# Patient Record
Sex: FEMALE | Race: OTHER | HISPANIC OR LATINO | Employment: STUDENT | URBAN - METROPOLITAN AREA
[De-identification: names, ages, dates, MRNs, and addresses within clinical notes are randomized per-mention and may not be internally consistent; named-entity substitution may affect disease eponyms.]

---
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
## 2022-05-09 ENCOUNTER — OFFICE VISIT (OUTPATIENT)
Dept: OBGYN CLINIC | Facility: CLINIC | Age: 22
End: 2022-05-09
Payer: MEDICARE

## 2022-05-09 VITALS
BODY MASS INDEX: 22.84 KG/M2 | DIASTOLIC BLOOD PRESSURE: 60 MMHG | HEIGHT: 61 IN | WEIGHT: 121 LBS | SYSTOLIC BLOOD PRESSURE: 110 MMHG

## 2022-05-09 DIAGNOSIS — Z30.09 ENCOUNTER FOR COUNSELING REGARDING CONTRACEPTION: Primary | ICD-10-CM

## 2022-05-09 PROCEDURE — 99202 OFFICE O/P NEW SF 15 MIN: CPT | Performed by: NURSE PRACTITIONER

## 2022-05-09 RX ORDER — ETONOGESTREL 68 MG/1
68 IMPLANT SUBCUTANEOUS ONCE
COMMUNITY

## 2022-05-09 NOTE — PROGRESS NOTES
Uriel Caldwell is a 25 y o  female who presents for consult for Nexplanon removal  She has had her Nexplanon for 1 year and is not happy with her bleeding pattern  The patient is sexually active  Pertinent past medical history: none  Menstrual History:  OB History        0    Para   0    Term   0       0    AB   0    Living   0       SAB   0    IAB   0    Ectopic   0    Multiple   0    Live Births   0                  Patient's last menstrual period was 2022  The following portions of the patient's history were reviewed and updated as appropriate: allergies, current medications, past family history, past medical history, past social history, past surgical history and problem list     Review of Systems  Pertinent items are noted in HPI  Objective      /60   Ht 5' 1" (1 549 m)   Wt 54 9 kg (121 lb)   LMP 2022   BMI 22 86 kg/m²     Physical Exam  Constitutional:       Appearance: Normal appearance  HENT:      Head: Normocephalic and atraumatic  Musculoskeletal:        Arms:       Cervical back: Neck supple  Neurological:      Mental Status: She is alert  Skin:     General: Skin is warm  Vitals and nursing note reviewed  Assessment and Plan     25 y o , discontinuing nexplanon, no contraindications  She will be transitioning to an OCP            Schedule appt for Nexplanon removal

## 2022-10-04 ENCOUNTER — HOSPITAL ENCOUNTER (EMERGENCY)
Facility: HOSPITAL | Age: 22
Discharge: HOME | End: 2022-10-04
Attending: EMERGENCY MEDICINE
Payer: MEDICARE

## 2022-10-04 VITALS
WEIGHT: 119.71 LBS | SYSTOLIC BLOOD PRESSURE: 166 MMHG | DIASTOLIC BLOOD PRESSURE: 80 MMHG | OXYGEN SATURATION: 100 % | TEMPERATURE: 98.8 F | HEART RATE: 88 BPM | RESPIRATION RATE: 18 BRPM | BODY MASS INDEX: 22.62 KG/M2

## 2022-10-04 DIAGNOSIS — R30.0 DYSURIA: Primary | ICD-10-CM

## 2022-10-04 DIAGNOSIS — R10.2 PELVIC PAIN: ICD-10-CM

## 2022-10-04 LAB
BILIRUB UR QL STRIP: NEGATIVE
CLARITY UR: CLEAR
COLOR UR: YELLOW
EXT PREG TEST URINE: NEGATIVE
EXT. CONTROL ED NAV: NORMAL
GLUCOSE UR STRIP-MCNC: NEGATIVE MG/DL
HGB UR QL STRIP.AUTO: NEGATIVE
KETONES UR STRIP-MCNC: NEGATIVE MG/DL
LEUKOCYTE ESTERASE UR QL STRIP: NEGATIVE
NITRITE UR QL STRIP: NEGATIVE
PH UR STRIP.AUTO: 8 [PH]
PROT UR STRIP-MCNC: NEGATIVE MG/DL
SP GR UR STRIP.AUTO: 1.01 (ref 1–1.04)
UROBILINOGEN UA: 1 MG/DL

## 2022-10-04 PROCEDURE — 99283 EMERGENCY DEPT VISIT LOW MDM: CPT

## 2022-10-04 PROCEDURE — 87591 N.GONORRHOEAE DNA AMP PROB: CPT

## 2022-10-04 PROCEDURE — 96372 THER/PROPH/DIAG INJ SC/IM: CPT

## 2022-10-04 PROCEDURE — 87660 TRICHOMONAS VAGIN DIR PROBE: CPT

## 2022-10-04 PROCEDURE — 87480 CANDIDA DNA DIR PROBE: CPT

## 2022-10-04 PROCEDURE — 81003 URINALYSIS AUTO W/O SCOPE: CPT

## 2022-10-04 PROCEDURE — 87510 GARDNER VAG DNA DIR PROBE: CPT

## 2022-10-04 PROCEDURE — 99284 EMERGENCY DEPT VISIT MOD MDM: CPT

## 2022-10-04 PROCEDURE — 81025 URINE PREGNANCY TEST: CPT

## 2022-10-04 PROCEDURE — 87491 CHLMYD TRACH DNA AMP PROBE: CPT

## 2022-10-04 RX ORDER — DOXYCYCLINE HYCLATE 100 MG/1
100 CAPSULE ORAL 2 TIMES DAILY
Qty: 14 CAPSULE | Refills: 0 | Status: SHIPPED | OUTPATIENT
Start: 2022-10-04 | End: 2022-10-11

## 2022-10-04 RX ORDER — NAPROXEN 500 MG/1
500 TABLET ORAL 2 TIMES DAILY WITH MEALS
Qty: 10 TABLET | Refills: 0 | Status: SHIPPED | OUTPATIENT
Start: 2022-10-04 | End: 2023-10-04

## 2022-10-04 RX ADMIN — LIDOCAINE HYDROCHLORIDE 500 MG: 10 INJECTION, SOLUTION EPIDURAL; INFILTRATION; INTRACAUDAL; PERINEURAL at 22:06

## 2022-10-04 NOTE — Clinical Note
Lauren Blancas was seen and treated in our emergency department on 10/4/2022  Diagnosis:     Pantera Sanchez  may return to work on return date  She may return on this date: 10/05/2022         If you have any questions or concerns, please don't hesitate to call        Joie Mccullough PA-C    ______________________________           _______________          _______________  Hospital Representative                              Date                                Time

## 2022-10-04 NOTE — Clinical Note
Merari Enter was seen and treated in our emergency department on 10/4/2022  Diagnosis:     Ron Villegas  may return to work on return date  She may return on this date: 10/05/2022         If you have any questions or concerns, please don't hesitate to call        Daja May PA-C    ______________________________           _______________          _______________  Hospital Representative                              Date                                Time

## 2022-10-04 NOTE — Clinical Note
Colleen Rainey was seen and treated in our emergency department on 10/4/2022  Diagnosis:     Marybel Lomeli  may return to work on return date  She may return on this date: 10/05/2022         If you have any questions or concerns, please don't hesitate to call        Beatris Blum PA-C    ______________________________           _______________          _______________  Hospital Representative                              Date                                Time

## 2022-10-04 NOTE — Clinical Note
Candida Barakat was seen and treated in our emergency department on 10/4/2022  Diagnosis:     Sarah Nguyen  may return to work on return date  She may return on this date: 10/05/2022         If you have any questions or concerns, please don't hesitate to call        Charmayne Honey, PA-C    ______________________________           _______________          _______________  Hospital Representative                              Date                                Time

## 2022-10-04 NOTE — Clinical Note
Gabriel Salamanca was seen and treated in our emergency department on 10/4/2022  Diagnosis:     Vanita Garza  may return to work on return date  She may return on this date: 10/05/2022         If you have any questions or concerns, please don't hesitate to call        Kathya Chin PA-C    ______________________________           _______________          _______________  Hospital Representative                              Date                                Time

## 2022-10-05 LAB
C TRACH DNA SPEC QL NAA+PROBE: NEGATIVE
N GONORRHOEA DNA SPEC QL NAA+PROBE: NEGATIVE

## 2022-10-05 NOTE — DISCHARGE INSTRUCTIONS
If you would like further testing for sexually transmitted diseases please follow-up with the [facility] at [address]  Phone number is [phone]    You will be called in approximately 3 business days ONLY if you are positive for the sexually transmitted diseases for which you were tested today      Follow up with OBGYN    Return to ED for new or worsening symptoms as discussed

## 2022-10-05 NOTE — ED PROVIDER NOTES
History  Chief Complaint   Patient presents with    Bladder Problem     Pt reports via  that she is having pain in her bladder and groin that has been off and on for two months, but has worsened yesterday  Pt reports that she does have pain while urinating sometimes  No meds taken  Reports unprotected sex about 2 months ago      25 y o  F presents to ED c/o pelvic pain, vaginal discharge, dysuria x 1 month  History provided by:  Patient   used: Yes    Vaginal Discharge  Quality:  Malodorous and yellow  Duration:  1 month  Timing:  Sporadic  Progression:  Waxing and waning  Chronicity:  New  Context: after intercourse    Relieved by:  Nothing  Worsened by:  Nothing  Associated symptoms: abdominal pain (pelvic/suprapubic ), dyspareunia and dysuria    Associated symptoms: no fever, no genital lesions, no nausea, no rash, no urinary frequency, no urinary hesitancy, no urinary incontinence, no vaginal itching and no vomiting    Abdominal pain:     Location:  Suprapubic (pelvic)    Quality: cramping      Severity:  Mild    Duration:  1 month    Timing:  Sporadic    Progression:  Waxing and waning    Chronicity:  New  Dysuria:     Duration:  1 month    Timing:  Intermittent  Risk factors: unprotected sex    Risk factors: no gynecological surgery        Prior to Admission Medications   Prescriptions Last Dose Informant Patient Reported? Taking?   etonogestrel (Nexplanon) subdermal implant   Yes No   Si mg by Subdermal route once      Facility-Administered Medications: None       History reviewed  No pertinent past medical history  History reviewed  No pertinent surgical history  Family History   Problem Relation Age of Onset    Cancer Maternal Grandmother      I have reviewed and agree with the history as documented      E-Cigarette/Vaping    E-Cigarette Use Former User      E-Cigarette/Vaping Substances    Nicotine No     THC No     CBD No     Flavoring No     Other No  Unknown No      Social History     Tobacco Use    Smoking status: Never Smoker    Smokeless tobacco: Never Used   Vaping Use    Vaping Use: Former   Substance Use Topics    Alcohol use: No    Drug use: Yes     Types: Marijuana       Review of Systems   Constitutional: Negative for chills and fever  Eyes: Negative for discharge and redness  Respiratory: Negative for shortness of breath  Cardiovascular: Negative for chest pain  Gastrointestinal: Positive for abdominal pain (pelvic/suprapubic )  Negative for abdominal distention, blood in stool, nausea and vomiting  Genitourinary: Positive for dyspareunia, dysuria, pelvic pain, vaginal discharge and vaginal pain  Negative for bladder incontinence, decreased urine volume, difficulty urinating, flank pain, frequency, genital sores, hematuria, hesitancy, menstrual problem, urgency and vaginal bleeding  Musculoskeletal: Negative for arthralgias and myalgias  Skin: Negative for color change and rash  Neurological: Negative for weakness and numbness  All other systems reviewed and are negative  Physical Exam  Physical Exam  Vitals and nursing note reviewed  Exam conducted with a chaperone present (ED tech Thang )  Constitutional:       General: She is awake  She is not in acute distress  Appearance: Normal appearance  She is not toxic-appearing or diaphoretic  HENT:      Head: Normocephalic and atraumatic  Jaw: No swelling  Right Ear: External ear normal       Left Ear: External ear normal       Mouth/Throat:      Lips: Pink  Mouth: Mucous membranes are moist    Eyes:      General: Lids are normal  Vision grossly intact  Right eye: No discharge  Left eye: No discharge  Conjunctiva/sclera: Conjunctivae normal    Cardiovascular:      Rate and Rhythm: Normal rate and regular rhythm  Heart sounds: Normal heart sounds  Pulmonary:      Effort: Pulmonary effort is normal  No respiratory distress  Breath sounds: Normal breath sounds  Abdominal:      General: There is no distension  Palpations: Abdomen is soft  Tenderness: There is no abdominal tenderness  There is no right CVA tenderness, left CVA tenderness or guarding  Genitourinary:     General: Normal vulva  Labia:         Right: No rash, tenderness, lesion or injury  Left: No rash, tenderness, lesion or injury  Vagina: Vaginal discharge present  No tenderness or bleeding  Cervix: No cervical motion tenderness or cervical bleeding  Musculoskeletal:      Cervical back: Neck supple  Skin:     General: Skin is warm and dry  Coloration: Skin is not jaundiced or pale  Neurological:      Mental Status: She is alert  Gait: Gait normal    Psychiatric:         Mood and Affect: Mood normal          Behavior: Behavior normal          Thought Content: Thought content normal          Vital Signs  ED Triage Vitals [10/04/22 2042]   Temperature Pulse Respirations Blood Pressure SpO2   98 8 °F (37 1 °C) 88 18 166/80 100 %      Temp Source Heart Rate Source Patient Position - Orthostatic VS BP Location FiO2 (%)   Oral Monitor Sitting Left arm --      Pain Score       --           Vitals:    10/04/22 2042   BP: 166/80   Pulse: 88   Patient Position - Orthostatic VS: Sitting         Visual Acuity      ED Medications  Medications   cefTRIAXone (ROCEPHIN) 500 mg in lidocaine (PF) (XYLOCAINE-MPF) 1 % IM only syringe (500 mg Intramuscular Given 10/4/22 2206)       Diagnostic Studies  Results Reviewed     Procedure Component Value Units Date/Time    VAGINOSIS DNA PROBE (AFFIRM) [[id number]] Collected: 10/04/22 2200    Lab Status:  In process Specimen: Genital from Vaginal Updated: 10/04/22 2211    UA w Reflex to Microscopic w Reflex to Culture [[id number]]  (Normal) Collected: 10/04/22 2106    Lab Status: Final result Specimen: Urine, Clean Catch Updated: 10/04/22 2125     Color, UA Yellow     Clarity, UA Clear     Specific [place], UA 1 015     pH, UA 8 0     Leukocytes, UA Negative     Nitrite, UA Negative     Protein, UA Negative mg/dl      Glucose, UA Negative mg/dl      Ketones, UA Negative mg/dl      Bilirubin, UA Negative     Occult Blood, UA Negative     UROBILINOGEN UA 1 0 mg/dL     POCT pregnancy, urine [[id number]]  (Normal) Resulted: 10/04/22 2111    Lab Status: Final result Updated: 10/04/22 2112     EXT PREG TEST UR (Ref: Negative) negative     Control valid    Chlamydia/GC amplified DNA by PCR [[id number]] Collected: 10/04/22 2106    Lab Status: In process Specimen: Urine, Other Updated: 10/04/22 2110                 No orders to display              Procedures  Procedures         ED Course  ED Course as of 10/04/22 2257   Tue Oct 04, 2022   2213 No chandelier signs  Benign abdominal exam                                              MDM  Number of Diagnoses or Management Options  Dysuria  Pelvic pain  Diagnosis management comments: VSS  Afebrile  No abdominal tenderness on exam  No CMT  Vaginal discharge noted  No lesions or pelvic swelling noted  Low clinical suspicion for PID  Low clinical suspicion for emergent intra-abdominal/intrapelvic pathology at this time, however strict return precautions discussed  Will test for Gonorrhea, chlamydia, trich, BV  UA without acute findings  Via shared decision making patient would like empiric STD treatment  OBGYN follow up given  All imaging and/or lab testing discussed with patient, strict return to ED precautions discussed  Patient recommended to follow up promptly with appropriate outpatient provider  Patient and/or family members verbalizes understanding and agrees with plan  Patient and/or family members were given opportunity to ask questions, all questions were answered at this time  Patient is stable for discharge      Portions of the record may have been created with voice recognition software   Occasional wrong word or "sound a like" substitutions may have occurred due to the inherent limitations of voice recognition software  Read the chart carefully and recognize, using context, where substitutions have occurred  Amount and/or Complexity of Data Reviewed  Clinical lab tests: ordered and reviewed        Disposition  Final diagnoses:   Dysuria   Pelvic pain     Time reflects when diagnosis was documented in both MDM as applicable and the Disposition within this note     Time User Action Codes Description Comment    10/4/2022 10:10 PM Talib De La Cruz Add [R30 0] Dysuria     10/4/2022 10:10 PM Talib De La Cruz Add [R10 2] Pelvic pain       ED Disposition     ED Disposition   Discharge    Condition   Stable    Date/Time   Tue Oct 4, 2022 10:12 PM    Comment   Gabriel Salamanca discharge to home/self care  Follow-up Information     Follow up With Specialties Details Why Contact Info Additional [facility] Schedule an appointment as soon as possible for a visit  For follow up regarding your symptoms [address]  [address] [facility] [address]  [address], [address] [facility], [facility] [place], [place], [address] [phone]          Discharge Medication List as of 10/4/2022 10:34 PM      START taking these medications    Details   doxycycline hyclate (VIBRAMYCIN) 100 mg capsule Take 1 capsule (100 mg total) by mouth 2 (two) times a day for 7 days, Starting Tue 10/4/2022, Until Tue 10/11/2022, Normal      naproxen (EC NAPROSYN) 500 MG EC tablet Take 1 tablet (500 mg total) by mouth 2 (two) times a day with meals, Starting Tue 10/4/2022, Until Wed 10/4/2023, Normal         CONTINUE these medications which have NOT CHANGED    Details   etonogestrel (Nexplanon) subdermal implant 68 mg by Subdermal route once, Historical Med             No discharge procedures on file      PDMP Review     None          ED Provider  Electronically Signed by           Danita Jean Baptiste PA-C  10/06/22 1801

## 2022-10-06 LAB
CANDIDA RRNA VAG QL PROBE: NEGATIVE
G VAGINALIS RRNA GENITAL QL PROBE: POSITIVE
T VAGINALIS RRNA GENITAL QL PROBE: NEGATIVE

## 2022-10-08 DIAGNOSIS — B96.89 BV (BACTERIAL VAGINOSIS): Primary | ICD-10-CM

## 2022-10-08 DIAGNOSIS — N76.0 BV (BACTERIAL VAGINOSIS): Primary | ICD-10-CM

## 2022-10-08 RX ORDER — METRONIDAZOLE 500 MG/1
500 TABLET ORAL EVERY 12 HOURS SCHEDULED
Qty: 14 TABLET | Refills: 0 | Status: SHIPPED | OUTPATIENT
Start: 2022-10-08 | End: 2022-10-15

## 2022-10-08 NOTE — RESULT ENCOUNTER NOTE
Attempted to call regarding +BV results  No answer on pt's or emergency contact number, unable to [facility]   Sent Rx for flagyl to pharmacy

## 2022-12-27 ENCOUNTER — HOSPITAL ENCOUNTER (EMERGENCY)
Facility: HOSPITAL | Age: 22
Discharge: HOME | End: 2022-12-27
Attending: EMERGENCY MEDICINE

## 2022-12-27 VITALS
TEMPERATURE: 98.5 F | BODY MASS INDEX: 20.8 KG/M2 | SYSTOLIC BLOOD PRESSURE: 132 MMHG | RESPIRATION RATE: 18 BRPM | WEIGHT: 110.1 LBS | OXYGEN SATURATION: 99 % | HEART RATE: 86 BPM | DIASTOLIC BLOOD PRESSURE: 86 MMHG

## 2022-12-27 DIAGNOSIS — G43.909 MIGRAINE WITHOUT STATUS MIGRAINOSUS, NOT INTRACTABLE, UNSPECIFIED MIGRAINE TYPE: Primary | ICD-10-CM

## 2022-12-27 LAB
EXT PREGNANCY TEST URINE: NEGATIVE
EXT. CONTROL: NORMAL

## 2022-12-27 RX ORDER — METOCLOPRAMIDE HYDROCHLORIDE 5 MG/ML
10 INJECTION INTRAMUSCULAR; INTRAVENOUS ONCE
Status: COMPLETED | OUTPATIENT
Start: 2022-12-27 | End: 2022-12-27

## 2022-12-27 RX ORDER — DIPHENHYDRAMINE HYDROCHLORIDE 50 MG/ML
25 INJECTION INTRAMUSCULAR; INTRAVENOUS ONCE
Status: COMPLETED | OUTPATIENT
Start: 2022-12-27 | End: 2022-12-27

## 2022-12-27 RX ORDER — KETOROLAC TROMETHAMINE 30 MG/ML
30 INJECTION, SOLUTION INTRAMUSCULAR; INTRAVENOUS ONCE
Status: COMPLETED | OUTPATIENT
Start: 2022-12-27 | End: 2022-12-27

## 2022-12-27 RX ADMIN — SODIUM CHLORIDE 1000 ML: 0.9 INJECTION, SOLUTION INTRAVENOUS at 20:24

## 2022-12-27 RX ADMIN — KETOROLAC TROMETHAMINE 30 MG: 30 INJECTION, SOLUTION INTRAMUSCULAR; INTRAVENOUS at 20:24

## 2022-12-27 RX ADMIN — DIPHENHYDRAMINE HYDROCHLORIDE 25 MG: 50 INJECTION, SOLUTION INTRAMUSCULAR; INTRAVENOUS at 20:26

## 2022-12-27 RX ADMIN — METOCLOPRAMIDE 10 MG: 5 INJECTION, SOLUTION INTRAMUSCULAR; INTRAVENOUS at 20:29

## 2022-12-27 NOTE — Clinical Note
Benjamin Nicole was seen and treated in our emergency department on 12/27/2022  Diagnosis:     Guilherme Honeycutt  is off the rest of the shift today  She may return on this date: If you have any questions or concerns, please don't hesitate to call        Manuel Ordonez RN    ______________________________           _______________          _______________  Hospital Representative                              Date                                Time

## 2022-12-28 NOTE — ED PROVIDER NOTES
History  Chief Complaint   Patient presents with   • Back Pain     Patient reports 8/10 back pain that began 2 weeks ago  Denies dysuria, hematuria or injury  • Headache     Patient reports 7/10 headache  States headache began  but worsened yesterday  Took one dose of Advil yesterday night  States photophobia, nausea and vomiting  Patient is a 79-year-old female who presents with 2 complaints  1   2+ week history of back pain, no trauma  No numbness/weakness  No meds taken  No urinary complaints  2   3 day h/o right sided headache   +nausea and photophobia  H/o similar headaches in past   No relief with advil yesterday  Prior to Admission Medications   Prescriptions Last Dose Informant Patient Reported? Taking?   etonogestrel (Nexplanon) subdermal implant   Yes No   Si mg by Subdermal route once   naproxen (EC NAPROSYN) 500 MG EC tablet   No No   Sig: Take 1 tablet (500 mg total) by mouth 2 (two) times a day with meals      Facility-Administered Medications: None       No past medical history on file  No past surgical history on file  Family History   Problem Relation Age of Onset   • Cancer Maternal Grandmother      I have reviewed and agree with the history as documented  E-Cigarette/Vaping   • E-Cigarette Use Former User      E-Cigarette/Vaping Substances   • Nicotine No    • THC No    • CBD No    • Flavoring No    • Other No    • Unknown No      Social History     Tobacco Use   • Smoking status: Never   • Smokeless tobacco: Never   Vaping Use   • Vaping Use: Former   Substance Use Topics   • Alcohol use: No   • Drug use: Yes     Types: Marijuana       Review of Systems   Constitutional: Negative  HENT: Negative  Eyes: Positive for photophobia  Respiratory: Negative  Cardiovascular: Negative  Gastrointestinal: Positive for nausea  Endocrine: Negative  Genitourinary: Negative  Musculoskeletal: Positive for back pain  Skin: Negative  Allergic/Immunologic: Negative  Neurological: Positive for headaches  Hematological: Negative  Psychiatric/Behavioral: Negative  All other systems reviewed and are negative  Physical Exam  Physical Exam  Vitals and nursing note reviewed  Constitutional:       Appearance: Normal appearance  She is normal weight  HENT:      Head: Normocephalic and atraumatic  Right Ear: Tympanic membrane, ear canal and external ear normal       Left Ear: Tympanic membrane, ear canal and external ear normal       Nose: Nose normal       Mouth/Throat:      Mouth: Mucous membranes are moist       Pharynx: Oropharynx is clear  Eyes:      Extraocular Movements: Extraocular movements intact  Conjunctiva/sclera: Conjunctivae normal       Pupils: Pupils are equal, round, and reactive to light  Cardiovascular:      Rate and Rhythm: Normal rate and regular rhythm  Pulses: Normal pulses  Heart sounds: Normal heart sounds  Pulmonary:      Effort: Pulmonary effort is normal       Breath sounds: Normal breath sounds  Abdominal:      General: Bowel sounds are normal       Palpations: Abdomen is soft  Musculoskeletal:         General: Normal range of motion  Cervical back: Normal range of motion  Comments: No spinal ttp, stepoff or deformity  No point muscular ttp  Skin:     General: Skin is warm  Capillary Refill: Capillary refill takes less than 2 seconds  Neurological:      General: No focal deficit present  Mental Status: She is alert  Cranial Nerves: No cranial nerve deficit  Sensory: No sensory deficit  Motor: No weakness        Coordination: Coordination normal       Gait: Gait normal    Psychiatric:         Mood and Affect: Mood normal          Behavior: Behavior normal          Vital Signs  ED Triage Vitals [12/27/22 2011]   Temperature Pulse Respirations Blood Pressure SpO2   98 5 °F (36 9 °C) 86 18 132/86 99 %      Temp Source Heart Rate Source Patient Position - Orthostatic VS BP Location FiO2 (%)   Oral Monitor Sitting Left arm --      Pain Score       8           Vitals:    12/27/22 2011   BP: 132/86   Pulse: 86   Patient Position - Orthostatic VS: Sitting         Visual Acuity      ED Medications  Medications   diphenhydrAMINE (BENADRYL) injection 25 mg (has no administration in time range)   ketorolac (TORADOL) injection 30 mg (has no administration in time range)   metoclopramide (REGLAN) injection 10 mg (has no administration in time range)   sodium chloride 0 9 % bolus 1,000 mL (has no administration in time range)       Diagnostic Studies  Results Reviewed     Procedure Component Value Units Date/Time    POCT pregnancy, urine [[id number]]     Lab Status: No result                  No orders to display              Procedures  Procedures         ED Course                                             MDM    Disposition  Final diagnoses:   None     ED Disposition     None      Follow-up Information    None         Patient's Medications   Discharge Prescriptions    No medications on file       No discharge procedures on file      PDMP Review     None          ED Provider  Electronically Signed by           Rashmi Woodward MD  12/28/22 5773

## 2023-01-10 ENCOUNTER — OFFICE VISIT (OUTPATIENT)
Dept: OBGYN CLINIC | Facility: CLINIC | Age: 23
End: 2023-01-10

## 2023-01-10 VITALS
BODY MASS INDEX: 21.14 KG/M2 | WEIGHT: 112 LBS | DIASTOLIC BLOOD PRESSURE: 70 MMHG | HEIGHT: 61 IN | SYSTOLIC BLOOD PRESSURE: 110 MMHG

## 2023-01-10 DIAGNOSIS — Z11.3 SCREENING EXAMINATION FOR STD (SEXUALLY TRANSMITTED DISEASE): ICD-10-CM

## 2023-01-10 DIAGNOSIS — Z30.46 ENCOUNTER FOR NEXPLANON REMOVAL: Primary | ICD-10-CM

## 2023-01-10 NOTE — PROGRESS NOTES
Gigi Thacker 23year-old presents for nexplanon removal and for STD screening  Her nexplanon was inserted in October 2018 and is overdue for removal  She is not interested in initiating another form of contraception at this time as she is not sexually active  Patient's last menstrual period was 12/01/2022  Universal Protocol:  Consent: Verbal consent obtained  Risks and benefits: risks, benefits and alternatives were discussed  Consent given by: patient  Time out: Immediately prior to procedure a "time out" was called to verify the correct patient, procedure, equipment, support staff and site/side marked as required  Timeout called at: 1/10/2023 11:24 AM   Patient understanding: patient states understanding of the procedure being performed  Patient identity confirmed: verbally with patient    Remove and insert drug implant    Date/Time: 1/10/2023 11:24 AM  Performed by: IVETTE Brody  Authorized by: IVETTE Brody     Indication:     Indication: Presence of non-biodegradable drug delivery implant    Pre-procedure:     Pre-procedure timeout performed: yes      Local anesthetic:  Lidocaine 1%    The site was cleaned and prepped in a sterile fashion: yes    Procedure:     Procedure:  Removal    Small stab incision was made in arm: yes      Left/right:  Left  Comments: The area surrounding the Nexplanon was prepared with iodine  The site was anesthetized with lidocaine  A skin incision was made over the distal aspect of the device  The  capsule lysed sharply and the device removed using a hemostat  Hemostasis was assured with surgi-glue and a pressure dressing was applied  The patient tolerated the procedure  well  Follow-up: The patient tolerated the procedure well without complications  Standard post-procedure care is explained and return  precautions are given  She appears well, afebrile  Abdomen: benign, soft, nontender, no masses    Pelvic Exam: normal external genitalia, vulva, vagina, cervix, uterus and adnexa  Adonis Harris was seen today for procedure, contraception and exposure to std  Diagnoses and all orders for this visit:    Encounter for Nexplanon removal  -     Remove and insert drug implant    Screening examination for STD (sexually transmitted disease)  -     Chlamydia/GC amplified DNA by PCR  -     Trichomonas vaginalis/Mycoplasma genitalium PCR  -     Molecular Vaginal Panel      Cultures collected for STD screening  Educated on safe sex with condoms in the event she decides on becoming sexually active  Results will be released to [facility], if abnormal will call to review and discuss treatment plan

## 2023-01-11 LAB
C GLABRATA DNA VAG QL NAA+PROBE: NEGATIVE
C KRUSEI DNA VAG QL NAA+PROBE: NEGATIVE
C TRACH DNA SPEC QL NAA+PROBE: NEGATIVE
CANDIDA SP 6 PNL VAG NAA+PROBE: NEGATIVE
M GENITALIUM DNA SPEC QL NAA+PROBE: NEGATIVE
N GONORRHOEA DNA SPEC QL NAA+PROBE: NEGATIVE
T VAGINALIS DNA SPEC QL NAA+PROBE: NEGATIVE
T VAGINALIS DNA VAG QL NAA+PROBE: NEGATIVE
VAGINOSIS/ITIS DNA PNL VAG PROBE+SIG AMP: NEGATIVE

## 2023-01-12 ENCOUNTER — TELEPHONE (OUTPATIENT)
Dept: OBGYN CLINIC | Facility: CLINIC | Age: 23
End: 2023-01-12

## 2023-02-23 ENCOUNTER — HOSPITAL ENCOUNTER (EMERGENCY)
Facility: HOSPITAL | Age: 23
Discharge: HOME | End: 2023-02-23
Attending: INTERNAL MEDICINE

## 2023-02-23 VITALS
HEART RATE: 88 BPM | BODY MASS INDEX: 20.99 KG/M2 | SYSTOLIC BLOOD PRESSURE: 123 MMHG | OXYGEN SATURATION: 99 % | TEMPERATURE: 98.2 F | RESPIRATION RATE: 16 BRPM | DIASTOLIC BLOOD PRESSURE: 79 MMHG | WEIGHT: 111.11 LBS

## 2023-02-23 DIAGNOSIS — Z20.2 STD EXPOSURE: Primary | ICD-10-CM

## 2023-02-23 LAB
BILIRUB UR QL STRIP: NEGATIVE
CLARITY UR: CLEAR
COLOR UR: NORMAL
EXT PREGNANCY TEST URINE: NEGATIVE
EXT. CONTROL: NORMAL
GLUCOSE UR STRIP-MCNC: NEGATIVE MG/DL
HGB UR QL STRIP.AUTO: NEGATIVE
KETONES UR STRIP-MCNC: NEGATIVE MG/DL
LEUKOCYTE ESTERASE UR QL STRIP: NEGATIVE
NITRITE UR QL STRIP: NEGATIVE
PH UR STRIP.AUTO: 7 [PH]
PROT UR STRIP-MCNC: NEGATIVE MG/DL
SP GR UR STRIP.AUTO: 1.01 (ref 1–1.04)
UROBILINOGEN UA: NEGATIVE MG/DL

## 2023-02-23 RX ORDER — DOXYCYCLINE HYCLATE 100 MG/1
100 CAPSULE ORAL 2 TIMES DAILY
Qty: 20 CAPSULE | Refills: 0 | Status: SHIPPED | OUTPATIENT
Start: 2023-02-23 | End: 2023-03-05

## 2023-02-23 RX ORDER — DOXYCYCLINE HYCLATE 100 MG/1
100 CAPSULE ORAL ONCE
Status: COMPLETED | OUTPATIENT
Start: 2023-02-23 | End: 2023-02-23

## 2023-02-23 RX ADMIN — DOXYCYCLINE 100 MG: 100 CAPSULE ORAL at 20:49

## 2023-02-23 RX ADMIN — WATER 500 MG: 1 INJECTION, SOLUTION INTRAMUSCULAR; INTRAVENOUS; SUBCUTANEOUS at 20:49

## 2023-02-24 LAB
C TRACH DNA SPEC QL NAA+PROBE: NEGATIVE
N GONORRHOEA DNA SPEC QL NAA+PROBE: NEGATIVE

## 2023-02-24 NOTE — DISCHARGE INSTRUCTIONS
Complete antibiotics as directed  Return for new or worsening complaints    Follow-up with primary care

## 2023-02-24 NOTE — ED PROVIDER NOTES
History  Chief Complaint   Patient presents with   • Abdominal Pain     80-year-old female without significant past medical history presents requesting STD screening  Patient states that she had unprotected sex with someone 1 week ago and since then has been having some pelvic pain with vaginal discharge  States that the discharge is intermittent but not usual for her  Patient had her Nexplanon removed last month and is also concerned about chance of pregnancy  Denies specific abdominal pain nausea vomiting or changes to bowel or bladder habits  Denies fevers  Denies any other complaints       History provided by:  Patient   used: No        Prior to Admission Medications   Prescriptions Last Dose Informant Patient Reported? Taking?   etonogestrel (Nexplanon) subdermal implant   Yes No   Si mg by Subdermal route once   naproxen (EC NAPROSYN) 500 MG EC tablet   No No   Sig: Take 1 tablet (500 mg total) by mouth 2 (two) times a day with meals   Patient not taking: Reported on 1/10/2023      Facility-Administered Medications: None       History reviewed  No pertinent past medical history  History reviewed  No pertinent surgical history  Family History   Problem Relation Age of Onset   • Cancer Maternal Grandmother      I have reviewed and agree with the history as documented  E-Cigarette/Vaping   • E-Cigarette Use Current Some Day User      E-Cigarette/Vaping Substances   • Nicotine Yes    • THC No    • CBD No    • Flavoring Yes    • Other No    • Unknown No      Social History     Tobacco Use   • Smoking status: Never   • Smokeless tobacco: Never   Vaping Use   • Vaping Use: Some days   • Substances: Nicotine, Flavoring   Substance Use Topics   • Alcohol use: Yes     Comment: occasional   • Drug use: Yes     Types: Marijuana       Review of Systems   Constitutional: Negative  Negative for chills and fatigue  HENT: Negative for ear pain and sore throat      Eyes: Negative for photophobia and redness  Respiratory: Negative for apnea, cough and shortness of breath  Cardiovascular: Negative for chest pain  Gastrointestinal: Negative for abdominal pain, nausea and vomiting  Genitourinary: Positive for vaginal discharge  Negative for dysuria  Musculoskeletal: Negative for arthralgias, neck pain and neck stiffness  Skin: Negative for rash  Neurological: Negative for dizziness, tremors, syncope and weakness  Psychiatric/Behavioral: Negative for suicidal ideas  Physical Exam  Physical Exam  Constitutional:       General: She is not in acute distress  Appearance: She is well-developed  She is not diaphoretic  Eyes:      Pupils: Pupils are equal, round, and reactive to light  Cardiovascular:      Rate and Rhythm: Normal rate and regular rhythm  Pulmonary:      Effort: Pulmonary effort is normal  No respiratory distress  Breath sounds: Normal breath sounds  Abdominal:      General: Bowel sounds are normal       Palpations: Abdomen is soft  Tenderness: There is no abdominal tenderness  There is no right CVA tenderness or left CVA tenderness  Genitourinary:     Comments: Deferred  Musculoskeletal:         General: Normal range of motion  Cervical back: Normal range of motion and neck supple  Skin:     General: Skin is warm and dry  Neurological:      Mental Status: She is alert and oriented to person, place, and time           Vital Signs  ED Triage Vitals [02/23/23 2018]   Temperature Pulse Respirations Blood Pressure SpO2   98 4 °F (36 9 °C) 86 18 129/70 100 %      Temp Source Heart Rate Source Patient Position - Orthostatic VS BP Location FiO2 (%)   Oral Monitor Sitting Left arm --      Pain Score       8           Vitals:    02/23/23 2018   BP: 129/70   Pulse: 86   Patient Position - Orthostatic VS: Sitting         Visual Acuity      ED Medications  Medications   doxycycline hyclate (VIBRAMYCIN) capsule 100 mg (100 mg Oral Given 2/23/23 2049) cefTRIAXone (ROCEPHIN) 500 mg in sterile water IM only syringe (500 mg Intramuscular Given 2/23/23 2049)       Diagnostic Studies  Results Reviewed     Procedure Component Value Units Date/Time    UA (URINE) with reflex to Scope [[id number]]  (Normal) Collected: 02/23/23 2030    Lab Status: Final result Specimen: Urine, Clean Catch Updated: 02/23/23 2100     Color, UA Straw     Clarity, UA Clear     Specific Gravity, UA 1 015     pH, UA 7 0     Leukocytes, UA Negative     Nitrite, UA Negative     Protein, UA Negative mg/dl      Glucose, UA Negative mg/dl      Ketones, UA Negative mg/dl      Bilirubin, UA Negative     Occult Blood, UA Negative     UROBILINOGEN UA Negative mg/dL     Chlamydia/GC amplified DNA by PCR [[id number]] Collected: 02/23/23 2050    Lab Status: In process Specimen: Urine, Other Updated: 02/23/23 2053    POCT pregnancy, urine [[id number]]  (Normal) Resulted: 02/23/23 2031    Lab Status: Final result Updated: 02/23/23 2031     EXT Preg Test, Ur Negative     Control Valid                 No orders to display              Procedures  Procedures         ED Course                                             Medical Decision Making  Patient had benign abdominal exam in the emergency department  Patient was only requesting STD testing and pregnancy testing today  Pregnancy testing negative  Without other symptoms no clear role for other testing today  Uncertain cause of symptoms however possibly related to STD versus bacterial vaginosis  Patient received treatment for gonorrhea and chlamydia today and was given strict return precautions  Demonstrates understanding  Discharged home  Amount and/or Complexity of Data Reviewed  Labs: ordered  Risk  Prescription drug management            Disposition  Final diagnoses:   STD exposure     Time reflects when diagnosis was documented in both MDM as applicable and the Disposition within this note     Time User Action Codes Description Comment 2/23/2023  9:00 PM Ninoska Farr Add [Z20 2] STD exposure       ED Disposition     ED Disposition   Discharge    Condition   Stable    Date/Time   u Feb 23, 2023  9:00 PM    Comment   Hunter Whitaker discharge to home/self care  Follow-up Information     Follow up With Specialties Details Why Contact Info Additional [id number] [facility] Emergency Department Emergency Medicine Go to  If symptoms worsen [address] [id number]  [address] [place] [address] Emergency Department          Patient's Medications   Discharge Prescriptions    DOXYCYCLINE HYCLATE (VIBRAMYCIN) 100 MG CAPSULE    Take 1 capsule (100 mg total) by mouth 2 (two) times a day for 10 days       Start Date: 2/23/2023 End Date: 3/5/2023       Order Dose: 100 mg       Quantity: 20 capsule    Refills: 0       No discharge procedures on file      PDMP Review     None          ED Provider  Electronically Signed by           Nash Wong PA-C  02/23/23 5657

## 2023-02-24 NOTE — ED TRIAGE NOTES
Reports lower abd pain, L lateral abd pain and vaginal pain  Back pain for 2 months and abd pain for about a week  Denies n/v/d  Denies fevers  Reports white discharge  Denies blood in urine or burning with urination   Unsure if she could be pregnant when asked

## 2023-03-01 ENCOUNTER — TELEPHONE (OUTPATIENT)
Dept: OBGYN CLINIC | Facility: CLINIC | Age: 23
End: 2023-03-01

## 2023-06-22 ENCOUNTER — OFFICE VISIT (OUTPATIENT)
Dept: OBGYN CLINIC | Facility: CLINIC | Age: 23
End: 2023-06-22

## 2023-06-22 VITALS
BODY MASS INDEX: 22.26 KG/M2 | HEART RATE: 71 BPM | DIASTOLIC BLOOD PRESSURE: 74 MMHG | WEIGHT: 117.8 LBS | SYSTOLIC BLOOD PRESSURE: 114 MMHG

## 2023-06-22 DIAGNOSIS — Z72.51 HIGH RISK SEXUAL BEHAVIOR, UNSPECIFIED TYPE: Primary | ICD-10-CM

## 2023-06-22 DIAGNOSIS — B96.89 BACTERIAL VAGINOSIS: ICD-10-CM

## 2023-06-22 DIAGNOSIS — N76.0 BACTERIAL VAGINOSIS: ICD-10-CM

## 2023-06-22 PROCEDURE — 87591 N.GONORRHOEAE DNA AMP PROB: CPT

## 2023-06-22 PROCEDURE — 87491 CHLMYD TRACH DNA AMP PROBE: CPT

## 2023-06-22 PROCEDURE — 99214 OFFICE O/P EST MOD 30 MIN: CPT | Performed by: OBSTETRICS & GYNECOLOGY

## 2023-06-22 RX ORDER — METRONIDAZOLE 500 MG/1
500 TABLET ORAL EVERY 12 HOURS SCHEDULED
Qty: 14 TABLET | Refills: 0 | Status: SHIPPED | OUTPATIENT
Start: 2023-06-22 | End: 2023-06-29

## 2023-06-22 NOTE — PROGRESS NOTES
OB/GYN VISIT  Jamila Mullen  6/22/2023  4:22 PM    Subjective:     Jamila Mullen is a 23 y o  [place] female who presents for evaluation for vaginal discharge  Patient states that for past week, she has had vaginal discharge and presented pictures of thin white discharge  She also states that she cleans her vagina internally with a douche  Additionally, the patient requests STI testing, as she had unprotected sex on the 1st of this month  Since then, she did have her period  Pt had nexplanon in place for past 6 years, and had it removed on 1/10/23 because she was spotting and did not like her bleeding profile  Since its removal, she has had regular periods  However, she has occasionally had unprotected sex  The patient is not interested in birth control at this time owing to its effect on her period and her mood, and she states that she will try to use condoms  In the past year, patient has presented to the ED and clinic several times for evaluation of vaginal discharge, pain, and/or patient request for STI testing (10/4/22, 1/10/23, 2/23/23, and 4/13/23)  She was last seen on 4/13/23 for yellow discharge and patient request for STI testing, with testing negative for chlamydia/gonorrhea, and wet mount negative for BV and yeast infection  Upon this visit, patient states that she does not have sex in exchange for money or goods  She is impulsive with her sexual behavior  She agrees that it may be helpful to have scheduled STI testing for the next few months  Objective:    Vitals: Blood pressure 114/74, pulse 71, weight 53 4 kg (117 lb 12 8 oz), last menstrual period 06/16/2023  Body mass index is 22 26 kg/m²  Physical Exam:  GEN: The patient was alert and oriented x3, pleasant well-appearing female in no acute distress     HEENT:  Unremarkable  CV:  Regular rate   RESP:  Unlabored breathing  GI:  Soft, nontender, non-distended  MSK: bilateral lower extremities are nontender, no edema  : Normal appearing external female genitalia, normal appearing urethral meatus  On sterile speculum exam,normal appearing vaginal epithelium, moderate quantity of thin white vaginal discharge, no bleeding, grossly normal appearing cervix  Assessment/Plan:  Problem List Items Addressed This Visit    None  Visit Diagnoses     High risk sexual behavior, unspecified type    -  Primary    -Patient counseled on importance of using condoms, as patient declines other forms of contraception at this time  -Encouraged to make an appointment with the office if she desires alternative contraceptive method  -Patient counseled to return to clinic for her first pap smear after she turns 23  Below labs ordered for next 3 months:     HIV 1/2 AG/AB w Reflex [facility] for 2 yr old and above    Chlamydia/GC amplified DNA by PCR    Hepatitis B surface antigen    Chlamydia/GC amplified DNA by PCR    RPR-Syphilis Screening (Total Syphilis IGG/IGM)    Hepatitis C antibody    Bacterial vaginosis        Diagnosed by wet mount    metroNIDAZOLE (FLAGYL) 500 mg tablet          D/w Dr Fermin Jeffrey MD  6/22/2023  4:22 PM    Please note, all notes within the [facility] are written in medical terminology for other doctors to read  If you have a question about something you see in your chart, please don't hesitate to ask about it at your next appointment  All test results are immediately available through [employer] as well, and I will review results at my earliest opportunity and contact you with the appropriate urgency

## 2023-06-23 LAB
C TRACH DNA SPEC QL NAA+PROBE: NEGATIVE
N GONORRHOEA DNA SPEC QL NAA+PROBE: NEGATIVE

## 2023-09-07 ENCOUNTER — HOSPITAL ENCOUNTER (EMERGENCY)
Facility: HOSPITAL | Age: 23
Discharge: HOME | End: 2023-09-07
Attending: EMERGENCY MEDICINE

## 2023-09-07 VITALS
TEMPERATURE: 97.9 F | WEIGHT: 119.1 LBS | HEART RATE: 96 BPM | SYSTOLIC BLOOD PRESSURE: 154 MMHG | DIASTOLIC BLOOD PRESSURE: 72 MMHG | BODY MASS INDEX: 22.5 KG/M2 | RESPIRATION RATE: 18 BRPM | OXYGEN SATURATION: 100 %

## 2023-09-07 DIAGNOSIS — N89.8 VAGINAL DISCHARGE: Primary | ICD-10-CM

## 2023-09-07 DIAGNOSIS — Z32.02 URINE PREGNANCY TEST NEGATIVE: ICD-10-CM

## 2023-09-07 DIAGNOSIS — Z76.89 ENCOUNTER FOR ASSESSMENT OF STD EXPOSURE: ICD-10-CM

## 2023-09-07 LAB
BILIRUB UR QL STRIP: NEGATIVE
CLARITY UR: CLEAR
COLOR UR: NORMAL
EXT PREGNANCY TEST URINE: NEGATIVE
EXT. CONTROL: NORMAL
GLUCOSE UR STRIP-MCNC: NEGATIVE MG/DL
HGB UR QL STRIP.AUTO: NEGATIVE
KETONES UR STRIP-MCNC: NEGATIVE MG/DL
LEUKOCYTE ESTERASE UR QL STRIP: NEGATIVE
NITRITE UR QL STRIP: NEGATIVE
PH UR STRIP.AUTO: 8 [PH]
PROT UR STRIP-MCNC: NEGATIVE MG/DL
SP GR UR STRIP.AUTO: 1.01 (ref 1–1.04)
UROBILINOGEN UA: NEGATIVE MG/DL

## 2023-09-07 PROCEDURE — 96372 THER/PROPH/DIAG INJ SC/IM: CPT

## 2023-09-07 PROCEDURE — 99284 EMERGENCY DEPT VISIT MOD MDM: CPT | Performed by: PHYSICIAN ASSISTANT

## 2023-09-07 PROCEDURE — 81025 URINE PREGNANCY TEST: CPT | Performed by: PHYSICIAN ASSISTANT

## 2023-09-07 PROCEDURE — 99283 EMERGENCY DEPT VISIT LOW MDM: CPT

## 2023-09-07 PROCEDURE — 81003 URINALYSIS AUTO W/O SCOPE: CPT | Performed by: PHYSICIAN ASSISTANT

## 2023-09-07 PROCEDURE — 87591 N.GONORRHOEAE DNA AMP PROB: CPT | Performed by: PHYSICIAN ASSISTANT

## 2023-09-07 PROCEDURE — 87491 CHLMYD TRACH DNA AMP PROBE: CPT | Performed by: PHYSICIAN ASSISTANT

## 2023-09-07 RX ORDER — DOXYCYCLINE HYCLATE 100 MG/1
100 CAPSULE ORAL 2 TIMES DAILY
Qty: 20 CAPSULE | Refills: 0 | Status: SHIPPED | OUTPATIENT
Start: 2023-09-07 | End: 2023-09-14

## 2023-09-07 RX ORDER — DOXYCYCLINE HYCLATE 100 MG/1
100 CAPSULE ORAL ONCE
Status: COMPLETED | OUTPATIENT
Start: 2023-09-07 | End: 2023-09-07

## 2023-09-07 RX ADMIN — LIDOCAINE HYDROCHLORIDE 500 MG: 10 INJECTION, SOLUTION EPIDURAL; INFILTRATION; INTRACAUDAL; PERINEURAL at 16:46

## 2023-09-07 RX ADMIN — DOXYCYCLINE 100 MG: 100 CAPSULE ORAL at 16:46

## 2023-09-07 NOTE — Clinical Note
Nicholas Toth was seen and treated in our emergency department on 9/7/2023. Diagnosis:     Jovanna Schwab  may return to work on return date. She may return on this date: 09/08/2023         If you have any questions or concerns, please don't hesitate to call.       Phuong Wu PA-C    ______________________________           _______________          _______________  Hospital Representative                              Date                                Time

## 2023-09-07 NOTE — ED PROVIDER NOTES
History  Chief Complaint   Patient presents with   • Nausea     Nausea, missed menstrual cycle with slight cramping        79-year-old female sexually active presenting for evaluation of nausea, lack of menstrual cycle and suprapubic abdominal discomfort/bloating patient reports it is not as severe as her typical menstrual cycle cramps and reports no vaginal bleeding. Patient ports she did have some brown vaginal discharge reports this resolved denies any vaginal irritation rashes lesions sores. Patient denies upper abdominal pain vomiting diarrhea fevers chills dysuria, hematuria or flank pain. Last menstrual period was reportedly August 1. Nausea  The primary symptoms include nausea. Primary symptoms do not include fever, fatigue, abdominal pain, vomiting, dysuria or rash. The illness does not include chills. Prior to Admission Medications   Prescriptions Last Dose Informant Patient Reported? Taking?   mirtazapine (REMERON) 7.5 MG tablet   Yes No      Facility-Administered Medications: None       History reviewed. No pertinent past medical history. History reviewed. No pertinent surgical history. Family History   Problem Relation Age of Onset   • Cancer Maternal Grandmother      I have reviewed and agree with the history as documented. E-Cigarette/Vaping   • E-Cigarette Use Current Some Day User      E-Cigarette/Vaping Substances   • Nicotine Yes    • THC No    • CBD No    • Flavoring Yes    • Other No    • Unknown No      Social History     Tobacco Use   • Smoking status: Every Day     Types: E-Cigarettes   • Smokeless tobacco: Never   • Tobacco comments:     vape   Vaping Use   • Vaping Use: Some days   • Substances: Nicotine, Flavoring   Substance Use Topics   • Alcohol use: Yes     Comment: occasional   • Drug use: Yes     Types: Marijuana       Review of Systems   Constitutional: Negative for chills, fatigue and fever.    HENT: Negative for congestion, ear pain, rhinorrhea and sore throat. Eyes: Negative for redness. Respiratory: Negative for chest tightness and shortness of breath. Cardiovascular: Negative for chest pain and palpitations. Gastrointestinal: Positive for nausea. Negative for abdominal pain and vomiting. Genitourinary: Positive for menstrual problem ( lack of menstruation). Negative for dysuria and hematuria. Musculoskeletal: Negative. Skin: Negative for rash. Neurological: Negative for dizziness, syncope, light-headedness and numbness. Physical Exam  Physical Exam  Vitals and nursing note reviewed. Constitutional:       Appearance: She is well-developed. HENT:      Head: Normocephalic. Eyes:      General: No scleral icterus. Cardiovascular:      Rate and Rhythm: Normal rate and regular rhythm. Pulmonary:      Effort: Pulmonary effort is normal.      Breath sounds: Normal breath sounds. No stridor. Abdominal:      General: There is no distension. Palpations: Abdomen is soft. Tenderness: There is no abdominal tenderness. Genitourinary:     Comments: Deferred to patient's gynecologist  Musculoskeletal:         General: Normal range of motion. Skin:     General: Skin is warm and dry. Capillary Refill: Capillary refill takes less than 2 seconds. Neurological:      Mental Status: She is alert and oriented to person, place, and time.          Vital Signs  ED Triage Vitals [09/07/23 1610]   Temperature Pulse Respirations Blood Pressure SpO2   97.9 °F (36.6 °C) 96 18 154/72 100 %      Temp Source Heart Rate Source Patient Position - Orthostatic VS BP Location FiO2 (%)   Tympanic Monitor Sitting Right arm --      Pain Score       --           Vitals:    09/07/23 1610   BP: 154/72   Pulse: 96   Patient Position - Orthostatic VS: Sitting         Visual Acuity      ED Medications  Medications   doxycycline hyclate (VIBRAMYCIN) capsule 100 mg (100 mg Oral Given 9/7/23 1646)   cefTRIAXone (ROCEPHIN) 500 mg in lidocaine (PF) (XYLOCAINE-MPF) 1 % IM only syringe (500 mg Intramuscular Given 9/7/23 1646)       Diagnostic Studies  Results Reviewed     Procedure Component Value Units Date/Time    VAGINOSIS DNA PROBE (AFFIRM) [[id number]] Collected: 09/07/23 1646    Lab Status: In process Specimen: Genital from Vaginal Updated: 09/07/23 1651    UA w Reflex to Microscopic w Reflex to Culture [[id number]]  (Normal) Collected: 09/07/23 1624    Lab Status: Final result Specimen: Urine, Clean Catch Updated: 09/07/23 1634     Color, UA Straw     Clarity, UA Clear     Specific Gravity, UA 1.010     pH, UA 8.0     Leukocytes, UA Negative     Nitrite, UA Negative     Protein, UA Negative mg/dl      Glucose, UA Negative mg/dl      Ketones, UA Negative mg/dl      Bilirubin, UA Negative     Occult Blood, UA Negative     UROBILINOGEN UA Negative mg/dL     Chlamydia/GC amplified DNA by PCR [[id number]] Collected: 09/07/23 1624    Lab Status: In process Specimen: Urine, Other Updated: 09/07/23 1628    POCT pregnancy, urine [[id number]]  (Normal) Resulted: 09/07/23 1625    Lab Status: Final result Updated: 09/07/23 1625     EXT Preg Test, Ur Negative     Control Valid                 No orders to display              Procedures  Procedures         ED Course  ED Course as of 09/07/23 1652   Thu Sep 07, 2023   1646 Patient was reexamined at this time and informed of laboratory and/or imaging results and was found to be stable for discharge. Return to emergency department criteria was reviewed with the patient who verbalized understanding and was agreeable to discharge and the treatment plan at this time. CRAFFT    Flowsheet Row Most Recent Value   CRAFFLUIZA Initial Screen: During the past 12 months, did you:    1. Drink any alcohol (more than a few sips)? No Filed at: 09/07/2023 1616   2. Smoke any marijuana or hashish No Filed at: 09/07/2023 1616   3.  Use anything else to get high? ("anything else" includes illegal drugs, over the counter and prescription drugs, and things that you sniff or '[place]')? No Filed at: 09/07/2023 1616                                          Medical Decision Making  22-year-old female presents for evaluation of suprapubic abdominal discomfort lack of menstrual cycle and nausea patient concern for potential pregnancy and reports she is sexually active would like testing and empiric treatment for gonorrhea and chlamydia patient reports some brown discharge which she reports has resolved. Patient denies any genital lesions or sores and reports she does have a gynecologist.  Physical exam is reassuring without any abdominal discomfort,  exam deferred to gynecology patient apparently treated for gonorrhea chlamydia, GC testing obtained for urine, urinalysis for potential UTI noted to be negative. Patient self swabbed for bacterial vaginosis, trichomoniasis and yeast.  Given normal vital signs and then on abdominal exam; low suspicion for acute intra-abdominal emergency such as ovarian torsion, ovarian cyst or other significant abnormalities no indication for blood work or imaging at this time. Strict return to ED precautions discussed. Patient and/or family members verbalizes understanding and agrees with plan. Patient is stable for discharge     Portions of the record may have been created with voice recognition software. Occasional wrong word or "sound a like" substitutions may have occurred due to the inherent limitations of voice recognition software. Read the chart carefully and recognize, using context, where substitutions have occurred. Amount and/or Complexity of Data Reviewed  Labs: ordered. Risk  Prescription drug management.           Disposition  Final diagnoses:   Vaginal discharge   Encounter for assessment of STD exposure   Urine pregnancy test negative     Time reflects when diagnosis was documented in both MDM as applicable and the Disposition within this note     Time User Action Codes Description Comment    9/7/2023  4:51 PM Lulla Churn Add [N89.8] Vaginal discharge     9/7/2023  4:51 PM Lulla Churn Add [Z76.89] Encounter for assessment of STD exposure     9/7/2023  4:51 PM Lulla Churn Add [Z32.02] Urine pregnancy test negative       ED Disposition     ED Disposition   Discharge    Condition   Stable    Date/Time   Thu Sep 7, 2023  4:51 PM    Comment   Yeimy Nicholson discharge to home/self care. Follow-up Information     Follow up With Specialties Details Why [address] [facility] [facility] Gyne[facility] Schedule an appointment as soon as possible for a visit in 2 days As needed [address] [facility]            Patient's Medications   Discharge Prescriptions    DOXYCYCLINE HYCLATE (VIBRAMYCIN) 100 MG CAPSULE    Take 1 capsule (100 mg total) by mouth 2 (two) times a day for 7 days       Start Date: 9/7/2023  End Date: 9/14/2023       Order Dose: 100 mg       Quantity: 20 capsule    Refills: 0       No discharge procedures on file.     PDMP Review     None          ED Provider  Electronically Signed by           Omar Crisostomo PA-C  09/07/23 0636

## 2023-09-08 LAB
C TRACH DNA SPEC QL NAA+PROBE: NEGATIVE
N GONORRHOEA DNA SPEC QL NAA+PROBE: NEGATIVE

## 2023-10-13 ENCOUNTER — APPOINTMENT (EMERGENCY)
Dept: CT IMAGING | Facility: HOSPITAL | Age: 23
End: 2023-10-13

## 2023-10-13 ENCOUNTER — HOSPITAL ENCOUNTER (EMERGENCY)
Facility: HOSPITAL | Age: 23
Discharge: HOME | End: 2023-10-13
Attending: EMERGENCY MEDICINE

## 2023-10-13 VITALS
HEART RATE: 64 BPM | WEIGHT: 116.1 LBS | SYSTOLIC BLOOD PRESSURE: 102 MMHG | OXYGEN SATURATION: 100 % | TEMPERATURE: 98.6 F | BODY MASS INDEX: 21.94 KG/M2 | RESPIRATION RATE: 18 BRPM | DIASTOLIC BLOOD PRESSURE: 67 MMHG

## 2023-10-13 DIAGNOSIS — R10.2 PELVIC PAIN: Primary | ICD-10-CM

## 2023-10-13 DIAGNOSIS — N89.8 VAGINAL DISCHARGE: ICD-10-CM

## 2023-10-13 LAB
ALBUMIN SERPL BCP-MCNC: 4.5 G/DL (ref 3.5–5)
ALP SERPL-CCNC: 31 U/L (ref 34–104)
ALT SERPL W P-5'-P-CCNC: 10 U/L (ref 7–52)
ANION GAP SERPL CALCULATED.3IONS-SCNC: 7 MMOL/L
AST SERPL W P-5'-P-CCNC: 14 U/L (ref 13–39)
BASOPHILS # BLD AUTO: 0.06 THOUSANDS/ÂΜL (ref 0–0.1)
BASOPHILS NFR BLD AUTO: 1 % (ref 0–1)
BILIRUB SERPL-MCNC: 0.77 MG/DL (ref 0.2–1)
BILIRUB UR QL STRIP: NEGATIVE
BUN SERPL-MCNC: 19 MG/DL (ref 5–25)
CALCIUM SERPL-MCNC: 9.5 MG/DL (ref 8.4–10.2)
CHLORIDE SERPL-SCNC: 104 MMOL/L (ref 96–108)
CLARITY UR: CLEAR
CO2 SERPL-SCNC: 25 MMOL/L (ref 21–32)
COLOR UR: ABNORMAL
CREAT SERPL-MCNC: 0.81 MG/DL (ref 0.6–1.3)
EOSINOPHIL # BLD AUTO: 0.08 THOUSAND/ÂΜL (ref 0–0.61)
EOSINOPHIL NFR BLD AUTO: 1 % (ref 0–6)
ERYTHROCYTE [DISTWIDTH] IN BLOOD BY AUTOMATED COUNT: 12.3 % (ref 11.6–15.1)
EXT PREGNANCY TEST URINE: NEGATIVE
EXT. CONTROL: NORMAL
GFR SERPL CREATININE-BSD FRML MDRD: 104 ML/MIN/1.73SQ M
GLUCOSE SERPL-MCNC: 78 MG/DL (ref 65–140)
GLUCOSE UR STRIP-MCNC: NEGATIVE MG/DL
HCT VFR BLD AUTO: 43.2 % (ref 34.8–46.1)
HGB BLD-MCNC: 14.1 G/DL (ref 11.5–15.4)
HGB UR QL STRIP.AUTO: NEGATIVE
IMM GRANULOCYTES # BLD AUTO: 0.03 THOUSAND/UL (ref 0–0.2)
IMM GRANULOCYTES NFR BLD AUTO: 1 % (ref 0–2)
KETONES UR STRIP-MCNC: ABNORMAL MG/DL
LEUKOCYTE ESTERASE UR QL STRIP: NEGATIVE
LYMPHOCYTES # BLD AUTO: 1.71 THOUSANDS/ÂΜL (ref 0.6–4.47)
LYMPHOCYTES NFR BLD AUTO: 26 % (ref 14–44)
MCH RBC QN AUTO: 31 PG (ref 26.8–34.3)
MCHC RBC AUTO-ENTMCNC: 32.6 G/DL (ref 31.4–37.4)
MCV RBC AUTO: 95 FL (ref 82–98)
MONOCYTES # BLD AUTO: 0.64 THOUSAND/ÂΜL (ref 0.17–1.22)
MONOCYTES NFR BLD AUTO: 10 % (ref 4–12)
NEUTROPHILS # BLD AUTO: 4.11 THOUSANDS/ÂΜL (ref 1.85–7.62)
NEUTS SEG NFR BLD AUTO: 61 % (ref 43–75)
NITRITE UR QL STRIP: NEGATIVE
NRBC BLD AUTO-RTO: 0 /100 WBCS
PH UR STRIP.AUTO: 5 [PH]
PLATELET # BLD AUTO: 230 THOUSANDS/UL (ref 149–390)
PMV BLD AUTO: 9 FL (ref 8.9–12.7)
POTASSIUM SERPL-SCNC: 4 MMOL/L (ref 3.5–5.3)
PROT SERPL-MCNC: 7.1 G/DL (ref 6.4–8.4)
PROT UR STRIP-MCNC: NEGATIVE MG/DL
RBC # BLD AUTO: 4.55 MILLION/UL (ref 3.81–5.12)
SODIUM SERPL-SCNC: 136 MMOL/L (ref 135–147)
SP GR UR STRIP.AUTO: 1.02 (ref 1–1.04)
UROBILINOGEN UA: NEGATIVE MG/DL
WBC # BLD AUTO: 6.63 THOUSAND/UL (ref 4.31–10.16)

## 2023-10-13 PROCEDURE — 99284 EMERGENCY DEPT VISIT MOD MDM: CPT

## 2023-10-13 PROCEDURE — 85025 COMPLETE CBC W/AUTO DIFF WBC: CPT

## 2023-10-13 PROCEDURE — 74176 CT ABD & PELVIS W/O CONTRAST: CPT

## 2023-10-13 PROCEDURE — 81003 URINALYSIS AUTO W/O SCOPE: CPT

## 2023-10-13 PROCEDURE — G1004 CDSM NDSC: HCPCS

## 2023-10-13 PROCEDURE — 80053 COMPREHEN METABOLIC PANEL: CPT

## 2023-10-13 PROCEDURE — 81025 URINE PREGNANCY TEST: CPT

## 2023-10-13 PROCEDURE — 87491 CHLMYD TRACH DNA AMP PROBE: CPT

## 2023-10-13 PROCEDURE — 87591 N.GONORRHOEAE DNA AMP PROB: CPT

## 2023-10-13 PROCEDURE — 81514 NFCT DS BV&VAGINITIS DNA ALG: CPT

## 2023-10-13 PROCEDURE — 36415 COLL VENOUS BLD VENIPUNCTURE: CPT

## 2023-10-13 RX ORDER — KETOROLAC TROMETHAMINE 30 MG/ML
15 INJECTION, SOLUTION INTRAMUSCULAR; INTRAVENOUS ONCE
Status: DISCONTINUED | OUTPATIENT
Start: 2023-10-13 | End: 2023-10-13

## 2023-10-13 RX ORDER — ACETAMINOPHEN 325 MG/1
650 TABLET ORAL ONCE
Status: DISCONTINUED | OUTPATIENT
Start: 2023-10-13 | End: 2023-10-13 | Stop reason: HOSPADM

## 2023-10-13 NOTE — ED PROVIDER NOTES
History  Chief Complaint   Patient presents with    Pelvic Pain     Pt reports pelvic pain that radiates into flank area, vaginal discharge and pressure with urination. 28-year-old female no reported past medical history presents emergency department complaining of LLQ pain radiates to L flank  x 1 day. 1 episode of vomiting, tolerating PO. LMP "end of September". Recent STD testing negative. 1 partner        History provided by:  Patient  Abdominal Pain  Pain location:  LLQ  Pain radiates to:  L flank  Duration:  1 day  Progression:  Waxing and waning  Chronicity:  Recurrent  Associated symptoms: constipation, vaginal discharge and vomiting    Associated symptoms: no anorexia, no chest pain, no chills, no diarrhea, no dysuria, no fever, no hematemesis, no hematochezia, no hematuria, no melena, no nausea, no shortness of breath and no vaginal bleeding    Vaginal discharge:     Quality:  Green    Duration:  3 weeks      Prior to Admission Medications   Prescriptions Last Dose Informant Patient Reported? Taking?   mirtazapine (REMERON) 7.5 MG tablet Past Week  Yes Yes      Facility-Administered Medications: None       History reviewed. No pertinent past medical history. History reviewed. No pertinent surgical history. Family History   Problem Relation Age of Onset    Cancer Maternal Grandmother      I have reviewed and agree with the history as documented.     E-Cigarette/Vaping    E-Cigarette Use Current Some Day User      E-Cigarette/Vaping Substances    Nicotine Yes     THC No     CBD No     Flavoring Yes     Other No     Unknown No      Social History     Tobacco Use    Smoking status: Every Day     Types: E-Cigarettes    Smokeless tobacco: Never    Tobacco comments:     vape   Vaping Use    Vaping Use: Some days    Substances: Nicotine, Flavoring   Substance Use Topics    Alcohol use: Yes     Comment: occasional    Drug use: Yes     Types: Marijuana       Review of Systems   Constitutional: Negative for appetite change, chills and fever. Respiratory:  Negative for shortness of breath. Cardiovascular:  Negative for chest pain. Gastrointestinal:  Positive for abdominal pain, blood in stool, constipation and vomiting. Negative for anorexia, diarrhea, hematemesis, hematochezia, melena and nausea. Genitourinary:  Positive for flank pain, pelvic pain and vaginal discharge. Negative for dysuria, frequency, genital sores, hematuria, urgency, vaginal bleeding and vaginal pain. Skin:  Negative for rash. All other systems reviewed and are negative. Physical Exam  Physical Exam  Vitals and nursing note reviewed. Exam conducted with a chaperone present (RN Student Wilton James. Keya PURVIS). Constitutional:       General: She is awake. She is not in acute distress. Appearance: Normal appearance. She is not ill-appearing, toxic-appearing or diaphoretic. HENT:      Head: Normocephalic. Mouth/Throat:      Lips: Pink. Mouth: Mucous membranes are moist.   Eyes:      General: Vision grossly intact. No scleral icterus. Cardiovascular:      Rate and Rhythm: Normal rate and regular rhythm. Heart sounds: Normal heart sounds. Pulmonary:      Effort: Pulmonary effort is normal. No respiratory distress. Breath sounds: Normal breath sounds. Abdominal:      General: There is no distension. Palpations: Abdomen is soft. Tenderness: There is abdominal tenderness in the suprapubic area and left lower quadrant. There is no right CVA tenderness, left CVA tenderness, guarding or rebound. Genitourinary:     Vagina: Vaginal discharge present. Cervix: Discharge present. No cervical motion tenderness. Uterus: Not tender. Adnexa:         Right: No tenderness. Left: No tenderness. Skin:     General: Skin is warm and dry. Capillary Refill: Capillary refill takes less than 2 seconds. Coloration: Skin is not jaundiced or pale.       Findings: No rash.   Neurological:      Mental Status: She is alert. Vital Signs  ED Triage Vitals   Temperature Pulse Respirations Blood Pressure SpO2   10/13/23 1518 10/13/23 1518 10/13/23 1518 10/13/23 1518 10/13/23 1518   98.6 °F (37 °C) (!) 112 17 118/93 100 %      Temp Source Heart Rate Source Patient Position - Orthostatic VS BP Location FiO2 (%)   10/13/23 1518 10/13/23 1518 10/13/23 1518 10/13/23 1518 --   Temporal Monitor Sitting Left arm       Pain Score       10/13/23 1750       2           Vitals:    10/13/23 1518 10/13/23 1750   BP: 118/93 102/67   Pulse: (!) 112 64   Patient Position - Orthostatic VS: Sitting Sitting         Visual Acuity      ED Medications  Medications - No data to display      Diagnostic Studies  Results Reviewed       Procedure Component Value Units Date/Time    Molecular Vaginal Panel [[id number]] Collected: 10/13/23 1651    Lab Status:  In process Specimen: Genital from Vaginal Updated: 10/13/23 1656    Comprehensive metabolic panel [[id number]]  (Abnormal) Collected: 10/13/23 1619    Lab Status: Final result Specimen: Blood from Arm, Left Updated: 10/13/23 1649     Sodium 136 mmol/L      Potassium 4.0 mmol/L      Chloride 104 mmol/L      CO2 25 mmol/L      ANION GAP 7 mmol/L      BUN 19 mg/dL      Creatinine 0.81 mg/dL      Glucose 78 mg/dL      Calcium 9.5 mg/dL      AST 14 U/L      ALT 10 U/L      Alkaline Phosphatase 31 U/L      Total Protein 7.1 g/dL      Albumin 4.5 g/dL      Total Bilirubin 0.77 mg/dL      eGFR 104 ml/min/1.73sq m     Narrative:      [place] guidelines for Chronic Kidney Disease (CKD):     Stage 1 with normal or high GFR (GFR > 90 mL/min/1.73 square meters)    Stage 2 Mild CKD (GFR = 60-89 mL/min/1.73 square meters)    Stage 3A Moderate CKD (GFR = 45-59 mL/min/1.73 square meters)    Stage 3B Moderate CKD (GFR = 30-44 mL/min/1.73 square meters)    Stage 4 Severe CKD (GFR = 15-29 mL/min/1.73 square meters)    Stage 5 End Stage CKD (GFR <15 mL/min/1.73 square meters)  Note: GFR calculation is accurate only with a steady state creatinine    CBC and differential [[id number]] Collected: 10/13/23 1619    Lab Status: Final result Specimen: Blood from Arm, Left Updated: 10/13/23 1634     WBC 6.63 Thousand/uL      RBC 4.55 Million/uL      Hemoglobin 14.1 g/dL      Hematocrit 43.2 %      MCV 95 fL      MCH 31.0 pg      MCHC 32.6 g/dL      RDW 12.3 %      MPV 9.0 fL      Platelets 282 Thousands/uL      nRBC 0 /100 WBCs      Neutrophils Relative 61 %      Immat GRANS % 1 %      Lymphocytes Relative 26 %      Monocytes Relative 10 %      Eosinophils Relative 1 %      Basophils Relative 1 %      Neutrophils Absolute 4.11 Thousands/µL      Immature Grans Absolute 0.03 Thousand/uL      Lymphocytes Absolute 1.71 Thousands/µL      Monocytes Absolute 0.64 Thousand/µL      Eosinophils Absolute 0.08 Thousand/µL      Basophils Absolute 0.06 Thousands/µL     UA w Reflex to Microscopic w Reflex to Culture [[id number]]  (Abnormal) Collected: 10/13/23 1606    Lab Status: Final result Specimen: Urine, Clean Catch Updated: 10/13/23 1625     Color, UA Jennyfer     Clarity, UA Clear     Specific Gravity, UA 1.020     pH, UA 5.0     Leukocytes, UA Negative     Nitrite, UA Negative     Protein, UA Negative mg/dl      Glucose, UA Negative mg/dl      Ketones, UA 5 (Trace) mg/dl      Bilirubin, UA Negative     Occult Blood, UA Negative     UROBILINOGEN UA Negative mg/dL     Chlamydia/GC amplified DNA by PCR [[id number]] Collected: 10/13/23 1606    Lab Status: In process Specimen: Urine, Other Updated: 10/13/23 1610    POCT pregnancy, urine [[id number]]  (Normal) Resulted: 10/13/23 1606    Lab Status: Final result Updated: 10/13/23 1606     EXT Preg Test, Ur Negative     Control Valid                   CT renal stone study abdomen pelvis without contrast   Final Result by Junior Harman MD (10/13 1724)      No urinary calculi identified. Large volume of rectal stool. Workstation performed: [id number]                    Procedures  Procedures         ED Course  ED Course as of 10/14/23 2113   Fri Oct 13, 2023   1611 Patient declines IV, IM toradol at this time. Will give PO.    1614 PREGNANCY TEST URINE: Negative   1621 Patient now saying she has no pain, declined the tylenol. [address] shared decision making will not treat empirically for STDs. 1726 CT renal stone study abdomen pelvis without contrast  IMPRESSION:     No urinary calculi identified. Large volume of rectal stool. [id number] Patient is refusing the pelvic ultrasound at this time, is able to verbalize understanding of the risks of refusing and the benefits of getting the ultrasound. She still refuses. She is Aa0x4, ambulating with steady gait, no evidence of intoxication or other mental impairment. SBIRT 22yo+      Flowsheet Row Most Recent Value   Initial Alcohol Screen: US AUDIT-C     1. How often do you have a drink containing alcohol? 0 Filed at: 10/13/2023 1520   2. How many drinks containing alcohol do you have on a typical day you are drinking? 0 Filed at: 10/13/2023 1520   3a. Male UNDER 65: How often do you have five or more drinks on one occasion? 0 Filed at: 10/13/2023 1520   3b. FEMALE Any Age, or MALE 65+: How often do you have 4 or more drinks on one occassion? 0 Filed at: 10/13/2023 1520   Audit-C Score 0 Filed at: 10/13/2023 1520   NORMAN: How many times in the past year have you. .. Used an illegal drug or used a prescription medication for non-medical reasons? Never Filed at: 10/13/2023 1520                      Medical Decision Making  Ddx includes but is not limited to pregnancy, ectopic, constipation, STD, PID, nephrolithiasis, UTI. VSS. Afebrile. Negative urine hcg. Abdominal exam with suprapubic, llq tenderness. No CVA tenderness or peritoneal signs. Pelvic exam without CMT, no adnexal tenderness, + discharge.  Std testing <1month ago negative, 1 partner via shared decision making will not treat empirically for STDs at this time. Molecular vaginal panel pending. UA without evidence of UTI.  CBC, CMP without clinically significant findings. CT renal stone study without acute findings, increased rectal stool. Discussed diet changes, otc miralax, colace with patient who is agreeable. Patient refused IV, ultrasound. Agreeable to outpatient follow-up closely with OB/GYN. All imaging and/or lab testing discussed with patient, strict return to ED precautions discussed. Patient recommended to follow up promptly with appropriate outpatient provider. Patient and/or family members verbalizes understanding and agrees with plan. Patient and/or family members were given opportunity to ask questions, all questions were answered at this time. Patient is stable for discharge. Portions of the record may have been created with voice recognition software. Occasional wrong word or "sound a like" substitutions may have occurred due to the inherent limitations of voice recognition software. Read the chart carefully and recognize, using context, where substitutions have occurred. Amount and/or Complexity of Data Reviewed  Labs: ordered. Decision-making details documented in ED Course. Radiology: ordered. Decision-making details documented in ED Course. Risk  OTC drugs. Disposition  Final diagnoses:   Pelvic pain   Vaginal discharge     Time reflects when diagnosis was documented in both MDM as applicable and the Disposition within this note       Time User Action Codes Description Comment    10/13/2023  6:14 PM Will Dawn Add [R10.2] Pelvic pain     10/13/2023  6:14 PM Will Dawn Add [N89.8] Vaginal discharge           ED Disposition       ED Disposition   Discharge    Condition   Stable    Date/Time   Fri Oct 13, 2023 [facility] discharge to home/self care.                    Follow-up Information       Follow up With Specialties Details Why Contact Info Additional [address] [facility] Schedule an appointment as soon as possible for a visit  For follow up regarding your symptoms [address]  [address] [facility] [id number]  [address] [place] [address], [address], [address] [place] [address], [id number] [phone]            Discharge Medication List as of 10/13/2023  6:15 PM        CONTINUE these medications which have NOT CHANGED    Details   mirtazapine (REMERON) 7.5 MG tablet Starting Tue 4/11/2023, Historical Med             No discharge procedures on file.     PDMP Review       None            ED Provider  Electronically Signed by             Luly Mobley PA-C  10/14/23 4640

## 2023-10-13 NOTE — DISCHARGE INSTRUCTIONS
We will call with any positive outstanding test results and make any necessary changes to treatment plan at that time. Follow up closely with OBGYN. Return to ED sooner for new or worsening symptoms as discussed. You may take motrin as needed for pain.

## 2023-10-16 LAB
C GLABRATA DNA VAG QL NAA+PROBE: NEGATIVE
C KRUSEI DNA VAG QL NAA+PROBE: NEGATIVE
C TRACH DNA SPEC QL NAA+PROBE: NEGATIVE
CANDIDA SP 6 PNL VAG NAA+PROBE: NEGATIVE
N GONORRHOEA DNA SPEC QL NAA+PROBE: NEGATIVE
T VAGINALIS DNA VAG QL NAA+PROBE: NEGATIVE
VAGINOSIS/ITIS DNA PNL VAG PROBE+SIG AMP: NEGATIVE

## 2024-03-13 ENCOUNTER — ANNUAL EXAM (OUTPATIENT)
Dept: OBGYN CLINIC | Facility: CLINIC | Age: 24
End: 2024-03-13

## 2024-03-13 VITALS
SYSTOLIC BLOOD PRESSURE: 116 MMHG | HEIGHT: 61 IN | BODY MASS INDEX: 24.7 KG/M2 | WEIGHT: 130.8 LBS | DIASTOLIC BLOOD PRESSURE: 68 MMHG

## 2024-03-13 DIAGNOSIS — Z11.3 SCREENING EXAMINATION FOR STD (SEXUALLY TRANSMITTED DISEASE): ICD-10-CM

## 2024-03-13 DIAGNOSIS — Z12.4 ENCOUNTER FOR PAPANICOLAOU SMEAR FOR CERVICAL CANCER SCREENING: ICD-10-CM

## 2024-03-13 DIAGNOSIS — Z01.419 ENCOUNTER FOR GYNECOLOGICAL EXAMINATION WITHOUT ABNORMAL FINDING: Primary | ICD-10-CM

## 2024-03-13 DIAGNOSIS — Z30.09 ENCOUNTER FOR COUNSELING REGARDING CONTRACEPTION: ICD-10-CM

## 2024-03-13 PROCEDURE — 87591 N.GONORRHOEAE DNA AMP PROB: CPT | Performed by: NURSE PRACTITIONER

## 2024-03-13 PROCEDURE — 87491 CHLMYD TRACH DNA AMP PROBE: CPT | Performed by: NURSE PRACTITIONER

## 2024-03-13 PROCEDURE — G0145 SCR C/V CYTO,THINLAYER,RESCR: HCPCS | Performed by: NURSE PRACTITIONER

## 2024-03-13 NOTE — PROGRESS NOTES
Assessment / Plan    1. Encounter for gynecological examination without abnormal finding  Normal well woman exam  First pap smear collected.    2. Encounter for Papanicolaou smear for cervical cancer screening    - Liquid-based pap, screening; Future    3. Screening examination for STD (sexually transmitted disease)  Patient requests STD screening.    - Chlamydia/GC amplified DNA by PCR  - Hepatitis C antibody; Future  - RPR-Syphilis Screening (Total Syphilis IGG/IGM); Future  - HIV 1/2 AG/AB w Reflex [facility] for 2 yr old and above; Future    4. Encounter for counseling regarding contraception  Encouraged initiation of BC since she is not using anything yet does not desire pregnancy.  She would like to avoid hormones that may affect her mood due to hx of BPD.  Discussed LN IUDs-- she will consider.  Encouraged at least condom use.        Subjective      Jimmy Ny is a 23 y.o. female who presents for her annual gynecologic exam.    NEW PATIENT  22 yo G0    Last seen 2023 at [facility].  Hx of Nexplanon use, removed 2023.    Last pap: none on record  Pap hx: n/a  STD screening: 10/2023 G/C negative  STD hx: none  Sexually active: yes, 8 month relationship  Condom use: no; counseled re: use  Gardasil vaccine: not sure, will check  Calcium intake: given guidelines  Exercise: 4-5x per week  Domestic violence: feels safe    Periods are regular, 3-4d, flow varies  Current contraception: none; used to have Nexplanon.  Reviewed options.  Would like to avoid options that would affect her mood due to h/o BPD.  History of abnormal Pap smear: n/a  Family history of breast,uterine, ovarian or colon cancer: no    Menstrual History:  OB History          0    Para   0    Term   0       0    AB   0    Living   0         SAB   0    IAB   0    Ectopic   0    Multiple   0    Live Births   0                  Patient's last menstrual period was 2024 (approximate).       The following portions of the patient's  "history were reviewed and updated as appropriate: allergies, current medications, past family history, past medical history, past social history, past surgical history, and problem list.    Review of Systems      Review of Systems   Constitutional:  Negative for chills and fever.   Respiratory:  Negative for cough and shortness of breath.    Gastrointestinal:  Negative for abdominal distention, abdominal pain, blood in stool, constipation, diarrhea, nausea and vomiting.   Genitourinary:  Negative for difficulty urinating, dysuria, frequency, genital sores, hematuria, menstrual problem, pelvic pain, urgency, vaginal bleeding and vaginal discharge.   Musculoskeletal:  Negative for arthralgias and myalgias.     Breasts:  Negative for skin changes, dimpling, asymmetry, nipple discharge, redness, tenderness or palpable masses    Objective      /68 (BP Location: Right arm, Patient Position: Sitting, Cuff Size: Standard)   Ht 5' 1\" (1.549 m)   Wt 59.3 kg (130 lb 12.8 oz)   LMP 03/06/2024 (Approximate)   BMI 24.71 kg/m²   Physical Exam  Constitutional:       General: She is not in acute distress.     Appearance: Normal appearance. She is well-developed and normal weight. She is not ill-appearing or diaphoretic.   HENT:      Head: Normocephalic and atraumatic.   Eyes:      Pupils: Pupils are equal, round, and reactive to light.   Neck:      Thyroid: No thyromegaly.   Pulmonary:      Effort: Pulmonary effort is normal.   Chest:   Breasts:     Breasts are symmetrical.      Right: No inverted nipple, mass, nipple discharge, skin change or tenderness.      Left: No inverted nipple, mass, nipple discharge, skin change or tenderness.   Abdominal:      General: There is no distension.      Palpations: Abdomen is soft. There is no mass.      Tenderness: There is no abdominal tenderness. There is no guarding or rebound.   Genitourinary:     General: Normal vulva.      Exam position: Lithotomy position.      Labia:         " Right: No rash, tenderness, lesion or injury.         Left: No rash, tenderness, lesion or injury.       Vagina: No signs of injury and foreign body. No vaginal discharge, erythema, tenderness or bleeding.      Cervix: No cervical motion tenderness, discharge or friability.      Uterus: Not enlarged and not tender.       Adnexa:         Right: No mass or tenderness.          Left: No mass or tenderness.     Musculoskeletal:      Cervical back: Neck supple.   Lymphadenopathy:      Cervical: No cervical adenopathy.      Upper Body:      Right upper body: No supraclavicular adenopathy.      Left upper body: No supraclavicular adenopathy.   Skin:     General: Skin is warm and dry.   Neurological:      General: No focal deficit present.      Mental Status: She is alert and oriented to person, place, and time.   Psychiatric:         Mood and Affect: Mood normal.         Behavior: Behavior normal.         Thought Content: Thought content normal.         Judgment: Judgment normal.

## 2024-03-13 NOTE — PATIENT INSTRUCTIONS
Look up information on the ISABELL IUD.    Check coverage with your insurance and call with your period to schedule.      Calcium and Osteoporosis   AMBULATORY CARE:   Why calcium is important for osteoporosis:  Calcium is important for osteoporosis because calcium helps build bone mass. Osteoporosis is a long-term medical condition that causes your body to break down more bone than it makes. Your bones become weak, brittle, and more likely to fracture.   Your calcium needs:   Women:      19 to 50 years: 1,000 mg    Over 50: 1,200 mg    Pregnant or breastfeeding, 19 years to 50 years: 1,000 mg    Men:      19 to 70: 1,000 mg    Over 70: 1,200 mg    Foods that are high in calcium:  The following list shows the number of calcium milligrams (mg) per serving. Your dietitian or healthcare provider can help you create a balanced meal plan for your calcium needs.  Dairy:      1 cup of low-fat plain yogurt (415 mg) or low-fat fruit yogurt (245 to 384 mg)    1½ ounces of shredded cheddar cheese (306 mg) or part skim mozzarella cheese (275 mg)    1 cup of skim, 2%, or whole milk (300 mg)    1 cup of cottage cheese made with 2% milk fat (138 mg)    ½ cup of frozen yogurt (103 mg)    Other foods:      1 cup of calcium-fortified orange juice (300 mg)    ½ cup of cooked maría elena greens (220 mg)    4 canned sardines, with bones (242 mg)    ½ cup of tofu (with added calcium) (204 mg)       How to get extra calcium:   Add powdered milk to puddings, cocoa, custard, or hot cereal.    Sift powdered milk into flour when you make cakes, cookies, or breads.    Use low-fat or fat-free milk instead of water in pancake mix, mashed potatoes, pudding, or hot breakfast cereal.    Add low-fat or fat-free cheese to salad, soup, or pasta.    Add tofu (with added calcium) to vegetable stir-zapata.    Take calcium supplements if you cannot get enough calcium from the foods you eat. Your body can absorb the most calcium from supplements when you take 500  mg or less at one time. Do not take more than 2,500 mg of calcium supplements each day.    Follow up with your doctor or dietitian as directed:  Write down your questions so you remember to ask them during your visits.  © Copyright [employer] 2023 Information is for End User's use only and may not be sold, redistributed or otherwise used for commercial purposes.  The above information is an  only. It is not intended as medical advice for individual conditions or treatments. Talk to your doctor, nurse or pharmacist before following any medical regimen to see if it is safe and effective for you.      EXERCISE RECOMMENDATIONS:  Recommend regular exercise, 30 minutes of cardiovascular, 4-5 times a week (brisk walking, jogging, biking, elliptical).

## 2024-03-15 LAB
C TRACH DNA SPEC QL NAA+PROBE: NEGATIVE
N GONORRHOEA DNA SPEC QL NAA+PROBE: NEGATIVE

## 2024-03-21 LAB
LAB AP GYN PRIMARY INTERPRETATION: NORMAL
Lab: NORMAL

## 2024-03-22 NOTE — RESULT ENCOUNTER NOTE
Pap smear normal.  Routine screening recommended.  Gonorrhea and chlamydia cultures were negative.

## 2024-06-25 ENCOUNTER — RA CDI HCC (OUTPATIENT)
Dept: OTHER | Facility: HOSPITAL | Age: 24
End: 2024-06-25

## 2024-06-27 ENCOUNTER — TELEPHONE (OUTPATIENT)
Dept: FAMILY MEDICINE CLINIC | Facility: CLINIC | Age: 24
End: 2024-06-27

## 2024-06-27 NOTE — TELEPHONE ENCOUNTER
first attempt to contact patient. left message to return my call on answering machine. Appointment on 7/5 needs to be rescheduled.

## 2024-07-18 ENCOUNTER — LAB (OUTPATIENT)
Dept: LAB | Facility: CLINIC | Age: 24
End: 2024-07-18
Payer: MEDICARE

## 2024-07-18 ENCOUNTER — OFFICE VISIT (OUTPATIENT)
Dept: OBGYN CLINIC | Facility: CLINIC | Age: 24
End: 2024-07-18
Payer: MEDICARE

## 2024-07-18 VITALS
BODY MASS INDEX: 24.39 KG/M2 | HEIGHT: 61 IN | DIASTOLIC BLOOD PRESSURE: 62 MMHG | WEIGHT: 129.2 LBS | SYSTOLIC BLOOD PRESSURE: 114 MMHG

## 2024-07-18 DIAGNOSIS — Z11.3 SCREENING EXAMINATION FOR STD (SEXUALLY TRANSMITTED DISEASE): ICD-10-CM

## 2024-07-18 DIAGNOSIS — R39.9 LOWER URINARY TRACT SYMPTOMS (LUTS): ICD-10-CM

## 2024-07-18 DIAGNOSIS — N89.8 VAGINAL DISCHARGE: Primary | ICD-10-CM

## 2024-07-18 DIAGNOSIS — R82.90 ABNORMAL URINALYSIS: ICD-10-CM

## 2024-07-18 LAB
HCV AB SER QL: NORMAL
HIV 1+2 AB+HIV1 P24 AG SERPL QL IA: NORMAL
HIV 2 AB SERPL QL IA: NORMAL
HIV1 AB SERPL QL IA: NORMAL
HIV1 P24 AG SERPL QL IA: NORMAL
SL AMB  POCT GLUCOSE, UA: ABNORMAL
SL AMB LEUKOCYTE ESTERASE,UA: ABNORMAL
SL AMB POCT BILIRUBIN,UA: ABNORMAL
SL AMB POCT BLOOD,UA: ABNORMAL
SL AMB POCT CLARITY,UA: ABNORMAL
SL AMB POCT COLOR,UA: ABNORMAL
SL AMB POCT KETONES,UA: ABNORMAL
SL AMB POCT NITRITE,UA: ABNORMAL
SL AMB POCT PH,UA: 6
SL AMB POCT SPECIFIC GRAVITY,UA: 1.01
SL AMB POCT URINE PROTEIN: ABNORMAL
SL AMB POCT UROBILINOGEN: ABNORMAL
TREPONEMA PALLIDUM IGG+IGM AB [PRESENCE] IN SERUM OR PLASMA BY IMMUNOASSAY: NORMAL

## 2024-07-18 PROCEDURE — 87510 GARDNER VAG DNA DIR PROBE: CPT | Performed by: NURSE PRACTITIONER

## 2024-07-18 PROCEDURE — 87389 HIV-1 AG W/HIV-1&-2 AB AG IA: CPT

## 2024-07-18 PROCEDURE — 36415 COLL VENOUS BLD VENIPUNCTURE: CPT

## 2024-07-18 PROCEDURE — 87591 N.GONORRHOEAE DNA AMP PROB: CPT | Performed by: NURSE PRACTITIONER

## 2024-07-18 PROCEDURE — 86803 HEPATITIS C AB TEST: CPT

## 2024-07-18 PROCEDURE — 87491 CHLMYD TRACH DNA AMP PROBE: CPT | Performed by: NURSE PRACTITIONER

## 2024-07-18 PROCEDURE — 81002 URINALYSIS NONAUTO W/O SCOPE: CPT | Performed by: NURSE PRACTITIONER

## 2024-07-18 PROCEDURE — 87086 URINE CULTURE/COLONY COUNT: CPT | Performed by: NURSE PRACTITIONER

## 2024-07-18 PROCEDURE — 99213 OFFICE O/P EST LOW 20 MIN: CPT | Performed by: NURSE PRACTITIONER

## 2024-07-18 PROCEDURE — 87480 CANDIDA DNA DIR PROBE: CPT | Performed by: NURSE PRACTITIONER

## 2024-07-18 PROCEDURE — 81001 URINALYSIS AUTO W/SCOPE: CPT | Performed by: NURSE PRACTITIONER

## 2024-07-18 PROCEDURE — 86780 TREPONEMA PALLIDUM: CPT

## 2024-07-18 PROCEDURE — 87660 TRICHOMONAS VAGIN DIR PROBE: CPT | Performed by: NURSE PRACTITIONER

## 2024-07-18 NOTE — PROGRESS NOTES
"Assessment/Plan:     1. Vaginal discharge  Will treat accordingly.    - VAGINOSIS DNA PROBE    2. Lower urinary tract symptoms (LUTS)  + leukos and blood.    - POCT urine dip    3. Abnormal urinalysis  Send for UA with C&S    - UA (URINE) with reflex to Scope  - Urine culture    4. Screening examination for STD (sexually transmitted disease)    - Chlamydia/GC amplified DNA by PCR      Subjective:     Patient ID: Jimmy Ny is a 23 y.o. female.    HPI  PROBLEM VISIT  CC: vaginal symptoms/std testing    24 yo G0 presents with urinary and vaginal complaints.    Patient reports vaginal discharge, white/creamy without odor.  Mild itching.  Also feels pressure at the end of her urine stream, along with frequency and urgency.    She is sexually active, new partner of 2 months  Used condoms for the first month only.  Counseled re: safe sex practices.  STD hx negative  3/2024 Pap, G/C negative    Review of Systems   Constitutional:  Negative for chills and fever.   Genitourinary:  Positive for dysuria (pressure), frequency, urgency and vaginal discharge (creamy, white, no odor). Negative for dyspareunia, genital sores, hematuria, menstrual problem, pelvic pain, vaginal bleeding and vaginal pain.        Recent antibiotic treatment- no  Mild itching         Objective:    Visit Vitals  /62 (BP Location: Left arm, Patient Position: Sitting, Cuff Size: Standard)   Ht 5' 1\" (1.549 m)   Wt 58.6 kg (129 lb 3.2 oz)   LMP 06/23/2024 (Exact Date)   BMI 24.41 kg/m²   OB Status Having periods   Smoking Status Every Day   BSA 1.57 m²     Urine dip: + leukos/ + blood     Physical Exam  Constitutional:       General: She is not in acute distress.     Appearance: Normal appearance. She is well-developed and normal weight. She is not ill-appearing or diaphoretic.   HENT:      Head: Normocephalic and atraumatic.   Eyes:      Pupils: Pupils are equal, round, and reactive to light.   Pulmonary:      Effort: Pulmonary effort is normal. "   Abdominal:      General: Abdomen is flat.      Palpations: Abdomen is soft.   Genitourinary:     General: Normal vulva.      Exam position: Lithotomy position.      Labia:         Right: No rash, tenderness, lesion or injury.         Left: No rash, tenderness, lesion or injury.       Vagina: No signs of injury and foreign body. Vaginal discharge (creamy) present. No erythema, tenderness or bleeding.      Cervix: No cervical motion tenderness, discharge or friability.      Uterus: Not enlarged and not tender.       Adnexa:         Right: No mass or tenderness.          Left: No mass or tenderness.     Skin:     General: Skin is warm and dry.   Neurological:      General: No focal deficit present.      Mental Status: She is alert and oriented to person, place, and time.   Psychiatric:         Mood and Affect: Mood normal.         Behavior: Behavior normal.         Thought Content: Thought content normal.         Judgment: Judgment normal.

## 2024-07-19 LAB
BACTERIA UR QL AUTO: ABNORMAL /HPF
BILIRUB UR QL STRIP: NEGATIVE
CANDIDA RRNA VAG QL PROBE: NOT DETECTED
CLARITY UR: ABNORMAL
COLOR UR: ABNORMAL
G VAGINALIS RRNA GENITAL QL PROBE: DETECTED
GLUCOSE UR STRIP-MCNC: NEGATIVE MG/DL
HGB UR QL STRIP.AUTO: ABNORMAL
KETONES UR STRIP-MCNC: NEGATIVE MG/DL
LEUKOCYTE ESTERASE UR QL STRIP: ABNORMAL
MUCOUS THREADS UR QL AUTO: ABNORMAL
NITRITE UR QL STRIP: NEGATIVE
NON-SQ EPI CELLS URNS QL MICRO: ABNORMAL /HPF
PH UR STRIP.AUTO: 8.5 [PH]
PROT UR STRIP-MCNC: ABNORMAL MG/DL
RBC #/AREA URNS AUTO: ABNORMAL /HPF
SP GR UR STRIP.AUTO: 1.02 (ref 1–1.03)
T VAGINALIS RRNA GENITAL QL PROBE: NOT DETECTED
UROBILINOGEN UR STRIP-ACNC: <2 MG/DL
WBC #/AREA URNS AUTO: ABNORMAL /HPF

## 2024-07-21 LAB — BACTERIA UR CULT: NORMAL

## 2024-07-22 DIAGNOSIS — N76.0 BACTERIAL VAGINOSIS: Primary | ICD-10-CM

## 2024-07-22 DIAGNOSIS — B96.89 BACTERIAL VAGINOSIS: Primary | ICD-10-CM

## 2024-07-22 LAB
C TRACH DNA SPEC QL NAA+PROBE: NEGATIVE
N GONORRHOEA DNA SPEC QL NAA+PROBE: NEGATIVE

## 2024-07-22 RX ORDER — METRONIDAZOLE 500 MG/1
500 TABLET ORAL EVERY 12 HOURS SCHEDULED
Qty: 14 TABLET | Refills: 0 | Status: SHIPPED | OUTPATIENT
Start: 2024-07-22 | End: 2024-07-29

## 2024-07-22 NOTE — RESULT ENCOUNTER NOTE
Urinalysis is positive for blood and white blood cells.  Urine culture is negative for infection, no treatment necessary.  Vaginal culture is positive for bacterial vaginosis and will treat with oral metronidazole .

## 2024-07-26 ENCOUNTER — OFFICE VISIT (OUTPATIENT)
Dept: OBGYN CLINIC | Facility: CLINIC | Age: 24
End: 2024-07-26
Payer: MEDICARE

## 2024-07-26 VITALS
HEIGHT: 61 IN | BODY MASS INDEX: 24.24 KG/M2 | SYSTOLIC BLOOD PRESSURE: 118 MMHG | DIASTOLIC BLOOD PRESSURE: 60 MMHG | WEIGHT: 128.4 LBS

## 2024-07-26 DIAGNOSIS — Z30.017 ENCOUNTER FOR INITIAL PRESCRIPTION OF NEXPLANON: Primary | ICD-10-CM

## 2024-07-26 PROCEDURE — 99212 OFFICE O/P EST SF 10 MIN: CPT | Performed by: NURSE PRACTITIONER

## 2024-07-26 NOTE — PROGRESS NOTES
"Assessment/Plan:     1. Encounter for initial prescription of Nexplanon  Patient just started her menstrual period.  Advised to confirm benefits.  RV 7/29/24 for insertion.      Subjective:     Patient ID: Jimmy Ny is a 23 y.o. female.    HPI  PROBLEM VISIT  CC: contraception    24 yo G0    Seen for yearly 3/2024 at which time she was not using any contraceptive method, yet did not desire pregnancy.  We discussed options.  She expressed desire to avoid systemic hormones given her h/o BPD.  Discussed LN IUD and encouraged at least condom use.    She has decided to pursue Nexplanon implant.    Review of Systems   Constitutional:  Negative for chills and fever.   Genitourinary: Negative.          Objective:    Visit Vitals  /60 (BP Location: Left arm, Patient Position: Sitting, Cuff Size: Standard)   Ht 5' 1\" (1.549 m)   Wt 58.2 kg (128 lb 6.4 oz)   LMP 07/26/2024 (Exact Date)   BMI 24.26 kg/m²   OB Status Having periods   Smoking Status Every Day   BSA 1.56 m²          Physical Exam  Constitutional:       General: She is not in acute distress.     Appearance: Normal appearance. She is normal weight. She is not ill-appearing or diaphoretic.   HENT:      Head: Normocephalic and atraumatic.   Eyes:      Pupils: Pupils are equal, round, and reactive to light.   Pulmonary:      Effort: Pulmonary effort is normal.   Skin:     General: Skin is warm and dry.   Neurological:      General: No focal deficit present.      Mental Status: She is alert and oriented to person, place, and time.   Psychiatric:         Mood and Affect: Mood normal.         Behavior: Behavior normal.         Thought Content: Thought content normal.         Judgment: Judgment normal.           "

## 2024-07-29 ENCOUNTER — PROCEDURE VISIT (OUTPATIENT)
Dept: OBGYN CLINIC | Facility: CLINIC | Age: 24
End: 2024-07-29
Payer: MEDICARE

## 2024-07-29 VITALS
BODY MASS INDEX: 24.17 KG/M2 | SYSTOLIC BLOOD PRESSURE: 100 MMHG | WEIGHT: 128 LBS | HEIGHT: 61 IN | DIASTOLIC BLOOD PRESSURE: 62 MMHG

## 2024-07-29 DIAGNOSIS — Z30.017 NEXPLANON INSERTION: Primary | ICD-10-CM

## 2024-07-29 LAB — SL AMB POCT URINE HCG: NEGATIVE

## 2024-07-29 PROCEDURE — 81025 URINE PREGNANCY TEST: CPT | Performed by: OBSTETRICS & GYNECOLOGY

## 2024-07-29 PROCEDURE — 11981 INSERTION DRUG DLVR IMPLANT: CPT | Performed by: OBSTETRICS & GYNECOLOGY

## 2024-07-29 NOTE — PROGRESS NOTES
"Subjective:     Jimmy Ny is a 23 y.o.  female who presents for Nexplanon insertion.    Objective:    Vitals: Blood pressure 100/62, height 5' 1\" (1.549 m), weight 58.1 kg (128 lb), last menstrual period 2024.Body mass index is 24.19 kg/m².    Physical Exam  Constitutional:       Appearance: She is well-developed.   Cardiovascular:      Rate and Rhythm: Normal rate and regular rhythm.      Heart sounds: Normal heart sounds. No murmur heard.     No friction rub. No gallop.   Pulmonary:      Effort: Pulmonary effort is normal. No respiratory distress.      Breath sounds: No wheezing.   Abdominal:      Palpations: Abdomen is soft.      Tenderness: There is no abdominal tenderness.   Neurological:      Mental Status: She is alert and oriented to person, place, and time.   Vitals reviewed.         Universal Protocol:  Consent: Verbal consent obtained. Written consent obtained.  Risks and benefits: risks, benefits and alternatives were discussed  Consent given by: patient  Patient understanding: patient states understanding of the procedure being performed  Patient consent: the patient's understanding of the procedure matches consent given  Procedure consent: procedure consent matches procedure scheduled  Relevant documents: relevant documents present and verified  Required items: required blood products, implants, devices, and special equipment available  Patient identity confirmed: verbally with patient  Remove and insert drug implant    Date/Time: 2024 11:30 AM    Performed by: Clarke Matthews MD  Authorized by: Clarke Matthews MD    Indication:     Indication: Insertion of non-biodegradable drug delivery implant    Pre-procedure:     Prepped with: povidone-iodine      Local anesthetic:  Lidocaine 1%    The site was cleaned and prepped in a sterile fashion: yes    Procedure:     Procedure:  Insertion    Left/right:  Left    Preloaded contraceptive capsule trocar was placed subdermally: yes      " Visualization of implant was obtained: yes      Contraceptive capsule was inserted and trocar removed: yes      Visualization of notch in stylet and palpation of device: yes      Palpation confirms placement by provider and patient: yes      Site was closed with steri-strips and pressure bandage applied: yes        Assessment/Plan:    Problem List Items Addressed This Visit    None  Visit Diagnoses       Nexplanon insertion    -  Primary    Relevant Medications    etonogestrel (NEXPLANON) subdermal implant 68 mg    Other Relevant Orders    POCT urine HCG (Completed)              Clarke Matthews MD  7/29/2024  2:38 PM

## 2024-12-26 ENCOUNTER — HOSPITAL ENCOUNTER (EMERGENCY)
Facility: HOSPITAL | Age: 24
Discharge: HOME | End: 2024-12-26
Attending: EMERGENCY MEDICINE
Payer: MEDICARE

## 2024-12-26 VITALS
DIASTOLIC BLOOD PRESSURE: 46 MMHG | BODY MASS INDEX: 23.24 KG/M2 | HEART RATE: 106 BPM | SYSTOLIC BLOOD PRESSURE: 139 MMHG | TEMPERATURE: 99.5 F | RESPIRATION RATE: 18 BRPM | WEIGHT: 123.02 LBS | OXYGEN SATURATION: 100 %

## 2024-12-26 DIAGNOSIS — N39.0 UTI (URINARY TRACT INFECTION): ICD-10-CM

## 2024-12-26 DIAGNOSIS — N76.0 BACTERIAL VAGINOSIS: ICD-10-CM

## 2024-12-26 DIAGNOSIS — B96.89 BACTERIAL VAGINOSIS: ICD-10-CM

## 2024-12-26 DIAGNOSIS — R39.15 URINARY URGENCY: ICD-10-CM

## 2024-12-26 DIAGNOSIS — N89.8 VAGINAL DISCHARGE: Primary | ICD-10-CM

## 2024-12-26 LAB
BACTERIA UR QL AUTO: ABNORMAL /HPF
BILIRUB UR QL STRIP: NEGATIVE
CLARITY UR: CLEAR
COLOR UR: YELLOW
EXT PREGNANCY TEST URINE: NEGATIVE
EXT. CONTROL: NORMAL
GLUCOSE UR STRIP-MCNC: NEGATIVE MG/DL
HGB UR QL STRIP.AUTO: NEGATIVE
KETONES UR STRIP-MCNC: NEGATIVE MG/DL
LEUKOCYTE ESTERASE UR QL STRIP: 25
MUCOUS THREADS UR QL AUTO: ABNORMAL
NITRITE UR QL STRIP: NEGATIVE
NON-SQ EPI CELLS URNS QL MICRO: ABNORMAL /HPF
PH UR STRIP.AUTO: 5 [PH]
PROT UR STRIP-MCNC: ABNORMAL MG/DL
RBC #/AREA URNS AUTO: ABNORMAL /HPF
SP GR UR STRIP.AUTO: 1.02 (ref 1–1.04)
UROBILINOGEN UA: NEGATIVE MG/DL
WBC #/AREA URNS AUTO: ABNORMAL /HPF

## 2024-12-26 PROCEDURE — 87186 SC STD MICRODIL/AGAR DIL: CPT

## 2024-12-26 PROCEDURE — 87086 URINE CULTURE/COLONY COUNT: CPT

## 2024-12-26 PROCEDURE — 81025 URINE PREGNANCY TEST: CPT

## 2024-12-26 PROCEDURE — NC001 PR NO CHARGE: Performed by: EMERGENCY MEDICINE

## 2024-12-26 PROCEDURE — 81003 URINALYSIS AUTO W/O SCOPE: CPT

## 2024-12-26 PROCEDURE — 87491 CHLMYD TRACH DNA AMP PROBE: CPT

## 2024-12-26 PROCEDURE — 99283 EMERGENCY DEPT VISIT LOW MDM: CPT

## 2024-12-26 PROCEDURE — 87591 N.GONORRHOEAE DNA AMP PROB: CPT

## 2024-12-26 PROCEDURE — 81001 URINALYSIS AUTO W/SCOPE: CPT

## 2024-12-26 PROCEDURE — 87077 CULTURE AEROBIC IDENTIFY: CPT

## 2024-12-26 PROCEDURE — 99284 EMERGENCY DEPT VISIT MOD MDM: CPT

## 2024-12-26 PROCEDURE — 81514 NFCT DS BV&VAGINITIS DNA ALG: CPT

## 2024-12-27 LAB
C GLABRATA DNA VAG QL NAA+PROBE: NEGATIVE
C KRUSEI DNA VAG QL NAA+PROBE: NEGATIVE
C TRACH DNA SPEC QL NAA+PROBE: NEGATIVE
CANDIDA SP 6 PNL VAG NAA+PROBE: NEGATIVE
N GONORRHOEA DNA SPEC QL NAA+PROBE: NEGATIVE
T VAGINALIS DNA VAG QL NAA+PROBE: NEGATIVE
VAGINOSIS/ITIS DNA PNL VAG PROBE+SIG AMP: POSITIVE

## 2024-12-27 NOTE — ED PROVIDER NOTES
Time reflects when diagnosis was documented in both MDM as applicable and the Disposition within this note       Time User Action Codes Description Comment    12/26/2024  9:36 PM Rachael Tafoya Add [N89.8] Vaginal discharge     12/26/2024  9:36 PM Rachael Tafoya Add [R39.15] Urinary urgency           ED Disposition       ED Disposition   Discharge    Condition   Stable    Date/Time   Thu Dec 26, 2024  9:36 PM    Comment   Jimmy Ny discharge to home/self care.                   Assessment & Plan         Medical Decision Making  Patient presents with vaginal discharge, urinary urgency, and suprapubic pain.  No significant tenderness to palpation of the abdomen on exam.  Differential diagnosis includes but is not limited to bacterial vaginosis, vulvovaginal candidiasis, STI, vaginitis, or urethritis; lower suspicion for UTI.  Urine microscopic revealed 4-10 nitrite negative WBCs with moderate bacteria.  There is a lower suspicion for UTI given the patient's main complaint is the vaginal discharge, will send a urine culture and withhold antibiotics unless culture comes back positive.  STI testing and likely vaginal panel were also collected and are pending.  Patient will be contacted with any positive results and the appropriate treatment will be initiated at that time.  She is in agreement with the treatment plan and all questions were answered.  Return precautions discussed and she verbalized understanding.  Follow-up with PCP and GYN, but return to the ED in the interim with new or worsening symptoms.    Amount and/or Complexity of Data Reviewed  Labs: ordered. Decision-making details documented in ED Course.        ED Course as of 12/27/24 0544   Thu Dec 26, 2024   2134 WBC, UA(!): 4-10   2134 Epithelial Cells: Occasional   2134 Bacteria, UA(!): Moderate       Medications - No data to display    ED Risk Strat Scores        SBIRT 22yo+      Flowsheet Row Most Recent Value   Initial Alcohol Screen: US AUDIT-C      1. How often do you have a drink containing alcohol? 1 Filed at: 12/26/2024 2030   2. How many drinks containing alcohol do you have on a typical day you are drinking?  1 Filed at: 12/26/2024 2030   3b. FEMALE Any Age, or MALE 65+: How often do you have 4 or more drinks on one occassion? 1 Filed at: 12/26/2024 2030   Audit-C Score 3 Filed at: 12/26/2024 2030   NORMAN: How many times in the past year have you...    Used an illegal drug or used a prescription medication for non-medical reasons? Never Filed at: 12/26/2024 2030              History of Present Illness       Chief Complaint   Patient presents with    Vaginal Discharge     Has been having whiteish discharge, foul smelling for about 2 weeks. No burning with urination. No vaginal itchiness. Possible chance of STD, some lower abd discomfort as well.       Past Medical History:   Diagnosis Date    Bipolar disorder (HCC)       Past Surgical History:   Procedure Laterality Date    WISDOM TOOTH EXTRACTION        Family History   Problem Relation Age of Onset    Cancer Maternal Grandmother     Breast cancer Neg Hx     Colon cancer Neg Hx     Ovarian cancer Neg Hx     Uterine cancer Neg Hx       Social History     Tobacco Use    Smoking status: Former     Types: E-Cigarettes    Smokeless tobacco: Never    Tobacco comments:     vape   Vaping Use    Vaping status: Some Days    Substances: Nicotine, Flavoring   Substance Use Topics    Alcohol use: Yes     Comment: occasional    Drug use: Yes     Types: Marijuana      E-Cigarette/Vaping    E-Cigarette Use Current Some Day User       E-Cigarette/Vaping Substances    Nicotine Yes     THC No     CBD No     Flavoring Yes     Other No     Unknown No       I have reviewed and agree with the history as documented.     The patient is a 24 y.o. female who presents for evaluation of vaginal discharge.  She reports two weeks of thin, malodorous, white vaginal discharge.  Associated symptoms include intermittent suprapubic/pelvic  pain and urinary urgency.  No vaginal bleeding, vaginal itching, hematuria, burning with urination, genital lesions, flank pain, or F/C.  The patient is sexually active and is concerned for possible STIs.          Review of Systems   Constitutional:  Negative for chills and fever.   HENT:  Negative for ear pain and sore throat.    Eyes:  Negative for pain and visual disturbance.   Respiratory:  Negative for cough and shortness of breath.    Cardiovascular:  Negative for chest pain and palpitations.   Gastrointestinal:  Negative for abdominal pain, diarrhea, nausea and vomiting.   Genitourinary:  Positive for pelvic pain, urgency and vaginal discharge. Negative for decreased urine volume, difficulty urinating, dysuria, flank pain, frequency, genital sores, hematuria and vaginal bleeding.   Musculoskeletal:  Negative for arthralgias, back pain and myalgias.   Skin:  Negative for color change and rash.   Neurological:  Negative for seizures and syncope.   All other systems reviewed and are negative.      Objective       ED Triage Vitals [12/26/24 2029]   Temperature Pulse Blood Pressure Respirations SpO2 Patient Position - Orthostatic VS   99.5 °F (37.5 °C) (!) 106 (!) 139/46 18 100 % Sitting      Temp Source Heart Rate Source BP Location FiO2 (%) Pain Score    Oral Monitor Right arm -- --      Vitals      Date and Time Temp Pulse SpO2 Resp BP Pain Score FACES Pain Rating User   12/26/24 2029 99.5 °F (37.5 °C) 106 100 % 18 139/46 -- -- ST            Physical Exam  Vitals and nursing note reviewed.   Constitutional:       General: She is awake. She is not in acute distress.     Appearance: Normal appearance. She is well-developed and normal weight. She is not toxic-appearing or diaphoretic.   HENT:      Head: Normocephalic and atraumatic.      Right Ear: External ear normal.      Left Ear: External ear normal.      Nose: Nose normal.      Mouth/Throat:      Lips: Pink.      Mouth: Mucous membranes are moist.       Pharynx: Oropharynx is clear. Uvula midline.   Eyes:      General: Lids are normal. Gaze aligned appropriately.      Conjunctiva/sclera: Conjunctivae normal.      Pupils: Pupils are equal, round, and reactive to light.   Cardiovascular:      Rate and Rhythm: Regular rhythm. Tachycardia present.      Heart sounds: S1 normal and S2 normal. No murmur heard.     No friction rub. No gallop.   Pulmonary:      Effort: Pulmonary effort is normal. No respiratory distress.   Abdominal:      General: Abdomen is flat.      Palpations: Abdomen is soft.      Tenderness: There is abdominal tenderness (Minimal) in the suprapubic area. There is no guarding or rebound.   Musculoskeletal:      Cervical back: Normal, full passive range of motion without pain and neck supple.      Thoracic back: Normal.      Lumbar back: Normal.   Lymphadenopathy:      Cervical: No cervical adenopathy.   Skin:     General: Skin is warm.      Capillary Refill: Capillary refill takes less than 2 seconds.      Coloration: Skin is not pale.      Findings: No rash.   Neurological:      Mental Status: She is alert and oriented to person, place, and time.   Psychiatric:         Attention and Perception: Attention normal.         Mood and Affect: Mood normal.         Speech: Speech normal.         Behavior: Behavior normal. Behavior is cooperative.         Results Reviewed       Procedure Component Value Units Date/Time    Urine culture [[id number]] Collected: 12/26/24 2148    Lab Status: In process Specimen: Urine, Clean Catch Updated: 12/26/24 2149    Urine Microscopic [[id number]]  (Abnormal) Collected: 12/26/24 2046    Lab Status: Final result Specimen: Urine, Clean Catch Updated: 12/26/24 2134     RBC, UA 0-1 /hpf      WBC, UA 4-10 /hpf      Epithelial Cells Occasional /hpf      Bacteria, UA Moderate /hpf      MUCUS THREADS Moderate    UA w Reflex to Microscopic w Reflex to Culture [[id number]]  (Abnormal) Collected: 12/26/24 2046    Lab Status: Final result  Specimen: Urine, Clean Catch Updated: 12/26/24 2107     Color, UA Yellow     Clarity, UA Clear     Specific Gravity, UA 1.020     pH, UA 5.0     Leukocytes, UA 25.0     Nitrite, UA Negative     Protein, UA 15 (Trace) mg/dl      Glucose, UA Negative mg/dl      Ketones, UA Negative mg/dl      Bilirubin, UA Negative     Occult Blood, UA Negative     UROBILINOGEN UA Negative mg/dL     Molecular Vaginal Panel [[id number]] Collected: 12/26/24 2047    Lab Status: In process Specimen: Genital from Vaginal Updated: 12/26/24 2058    Chlamydia/GC amplified DNA by PCR [[id number]] Collected: 12/26/24 2047    Lab Status: In process Specimen: Urine, Other Updated: 12/26/24 2053    POCT pregnancy, urine [[id number]]  (Normal) Collected: 12/26/24 2046    Lab Status: Final result Updated: 12/26/24 2046     EXT Preg Test, Ur Negative     Control Valid            No orders to display       Procedures    ED Medication and Procedure Management   Prior to Admission Medications   Prescriptions Last Dose Informant Patient Reported? Taking?   mirtazapine (REMERON) 7.5 MG tablet   Yes No   Patient not taking: Reported on 7/18/2024      Facility-Administered Medications Last Administration Doses Remaining   etonogestrel (NEXPLANON) subdermal implant 68 mg 7/29/2024 12:35 PM         Discharge Medication List as of 12/26/2024  9:36 PM        CONTINUE these medications which have NOT CHANGED    Details   mirtazapine (REMERON) 7.5 MG tablet Starting Tue 4/11/2023, Historical Med           No discharge procedures on file.  ED SEPSIS DOCUMENTATION   Time reflects when diagnosis was documented in both MDM as applicable and the Disposition within this note       Time User Action Codes Description Comment    12/26/2024  9:36 PM Rachael Tafoya [N89.8] Vaginal discharge     12/26/2024  9:36 PM Rachael Tafoya [R39.15] Urinary urgency                  Rachael Tafoya PA-C  12/27/24 0544

## 2024-12-28 LAB
BACTERIA UR CULT: ABNORMAL
BACTERIA UR CULT: ABNORMAL

## 2025-01-03 ENCOUNTER — RESULTS FOLLOW-UP (OUTPATIENT)
Dept: EMERGENCY DEPT | Facility: HOSPITAL | Age: 25
End: 2025-01-03

## 2025-01-03 NOTE — LETTER
January 3, 2025     Jimmy Ny  [address] [facility] [address]      Dear MsShelbi Ny:    We have been trying to reach you in regard to additional test results from your recent ER visit.  Please call us at [phone].      Sincerely,        Reyna Wellington PA-C

## 2025-01-06 RX ORDER — METRONIDAZOLE 500 MG/1
500 TABLET ORAL EVERY 12 HOURS SCHEDULED
Qty: 14 TABLET | Refills: 0 | Status: SHIPPED | OUTPATIENT
Start: 2025-01-06 | End: 2025-01-13

## 2025-01-06 RX ORDER — CEPHALEXIN 500 MG/1
500 CAPSULE ORAL EVERY 12 HOURS SCHEDULED
Qty: 14 CAPSULE | Refills: 0 | Status: SHIPPED | OUTPATIENT
Start: 2025-01-06 | End: 2025-01-13

## 2025-01-06 NOTE — ED PROVIDER NOTES
At 0940 on January 6, 2025 the patient called regarding the results. Positive bacterial vaginosis and urinary tract infection was relayed to the patient and medications sent to the preferred (confirmed) pharmacy. Discussed precautions while taking these medications.     Jessica Villalpando PA-C  01/06/25 [id number]

## 2025-02-10 ENCOUNTER — HOSPITAL ENCOUNTER (EMERGENCY)
Facility: HOSPITAL | Age: 25
Discharge: HOME | End: 2025-02-10
Attending: EMERGENCY MEDICINE
Payer: MEDICARE

## 2025-02-10 VITALS
WEIGHT: 121.25 LBS | DIASTOLIC BLOOD PRESSURE: 73 MMHG | HEART RATE: 97 BPM | TEMPERATURE: 98.3 F | RESPIRATION RATE: 18 BRPM | SYSTOLIC BLOOD PRESSURE: 106 MMHG | OXYGEN SATURATION: 99 % | BODY MASS INDEX: 22.91 KG/M2

## 2025-02-10 DIAGNOSIS — N89.8 VAGINAL DISCHARGE: Primary | ICD-10-CM

## 2025-02-10 DIAGNOSIS — N76.0 BACTERIAL VAGINOSIS: ICD-10-CM

## 2025-02-10 DIAGNOSIS — B96.89 BACTERIAL VAGINOSIS: ICD-10-CM

## 2025-02-10 LAB
BILIRUB UR QL STRIP: NEGATIVE
CLARITY UR: ABNORMAL
COLOR UR: YELLOW
EXT PREGNANCY TEST URINE: NEGATIVE
EXT. CONTROL: NORMAL
GLUCOSE UR STRIP-MCNC: NEGATIVE MG/DL
HGB UR QL STRIP.AUTO: NEGATIVE
KETONES UR STRIP-MCNC: NEGATIVE MG/DL
LEUKOCYTE ESTERASE UR QL STRIP: NEGATIVE
NITRITE UR QL STRIP: NEGATIVE
PH UR STRIP.AUTO: 7 [PH]
PROT UR STRIP-MCNC: NEGATIVE MG/DL
SP GR UR STRIP.AUTO: 1.01 (ref 1–1.04)
UROBILINOGEN UA: NEGATIVE MG/DL

## 2025-02-10 PROCEDURE — 99283 EMERGENCY DEPT VISIT LOW MDM: CPT

## 2025-02-10 PROCEDURE — 99284 EMERGENCY DEPT VISIT MOD MDM: CPT

## 2025-02-10 PROCEDURE — 81514 NFCT DS BV&VAGINITIS DNA ALG: CPT

## 2025-02-10 PROCEDURE — 81003 URINALYSIS AUTO W/O SCOPE: CPT

## 2025-02-10 PROCEDURE — 81025 URINE PREGNANCY TEST: CPT

## 2025-02-10 RX ORDER — FLUCONAZOLE 100 MG/1
150 TABLET ORAL ONCE
Status: COMPLETED | OUTPATIENT
Start: 2025-02-10 | End: 2025-02-10

## 2025-02-10 RX ADMIN — FLUCONAZOLE 150 MG: 100 TABLET ORAL at 21:10

## 2025-02-10 NOTE — Clinical Note
Jimmy Ny was seen and treated in our emergency department on 2/10/2025.                Diagnosis:     Jimmy  may return to work on return date.    She may return on this date: 02/11/2025         If you have any questions or concerns, please don't hesitate to call.      Rachael Tafoya PA-C    ______________________________           _______________          _______________  Hospital Representative                              Date                                Time

## 2025-02-11 ENCOUNTER — RESULTS FOLLOW-UP (OUTPATIENT)
Dept: EMERGENCY DEPT | Facility: HOSPITAL | Age: 25
End: 2025-02-11

## 2025-02-11 LAB
C GLABRATA DNA VAG QL NAA+PROBE: NEGATIVE
C KRUSEI DNA VAG QL NAA+PROBE: NEGATIVE
CANDIDA SP 6 PNL VAG NAA+PROBE: POSITIVE
T VAGINALIS DNA VAG QL NAA+PROBE: NEGATIVE
VAGINOSIS/ITIS DNA PNL VAG PROBE+SIG AMP: POSITIVE

## 2025-02-11 RX ORDER — METRONIDAZOLE 500 MG/1
500 TABLET ORAL EVERY 12 HOURS SCHEDULED
Qty: 14 TABLET | Refills: 0 | Status: SHIPPED | OUTPATIENT
Start: 2025-02-11 | End: 2025-02-18

## 2025-02-11 NOTE — ED PROVIDER NOTES
Time reflects when diagnosis was documented in both MDM as applicable and the Disposition within this note       Time User Action Codes Description Comment    2/10/2025  9:02 PM Rachael Tafoya Add [N89.8] Vaginal discharge           ED Disposition       ED Disposition   Discharge    Condition   Stable    Date/Time   Mon Feb 10, 2025  9:02 PM    Comment   Jimmy Ny discharge to home/self care.                   Assessment & Plan         Medical Decision Making  Patient presents with vaginal discharge, vaginal irritation, and burning with urination.  Differential diagnosis includes but is not limited to bacterial vaginosis, vulvovaginal candidiasis, STI, vaginitis, or urethritis; lower suspicion for UTI.  Patient reports no concern for STIs as she has not been sexually active since her last visit.  UA not consistent with UTI.  On examination there is vulvovaginal erythema concerning for candidiasis.  A dose of Diflucan was given in the department.  Molecular vaginal panel obtained and patient will be contacted if any further treatment is required.  She is in agreement with the treatment plan and all questions were answered.  Return precautions discussed and she verbalized understanding.  Follow-up with PCP and GYN, but return to the ED in the interim with new or worsening symptoms.    Amount and/or Complexity of Data Reviewed  Labs: ordered. Decision-making details documented in ED Course.    Risk  Prescription drug management.        ED Course as of 02/10/25 2117   Mon Feb 10, 2025   2101 Leukocytes, UA: Negative   2101 Nitrite, UA: Negative   2102 Blood, UA: Negative       Medications   fluconazole (DIFLUCAN) tablet 150 mg (150 mg Oral Given 2/10/25 2110)       ED Risk Strat Scores          History of Present Illness       Chief Complaint   Patient presents with    Vaginal Discharge     Reports she was here last month and was diagnosed with an infection and was given pills but reports she still have discharge -  whitish/yellow, itching and burning when she pees       Past Medical History:   Diagnosis Date    Bipolar disorder (HCC)       Past Surgical History:   Procedure Laterality Date    WISDOM TOOTH EXTRACTION        Family History   Problem Relation Age of Onset    Cancer Maternal Grandmother     Breast cancer Neg Hx     Colon cancer Neg Hx     Ovarian cancer Neg Hx     Uterine cancer Neg Hx       Social History     Tobacco Use    Smoking status: Former     Types: E-Cigarettes    Smokeless tobacco: Never    Tobacco comments:     vape   Vaping Use    Vaping status: Some Days    Substances: Nicotine, Flavoring   Substance Use Topics    Alcohol use: Yes     Comment: occasional    Drug use: Yes     Types: Marijuana      E-Cigarette/Vaping    E-Cigarette Use Current Some Day User       E-Cigarette/Vaping Substances    Nicotine Yes     THC No     CBD No     Flavoring Yes     Other No     Unknown No       I have reviewed and agree with the history as documented.     The patient is a 24 y.o. female with no significant past medial history, who presents for evaluation of vaginal discharge.  She was previously seen in the ED on 12/26/24 for vaginal discharge, dysuria, and urinary urgengy.  She was subsequently treated with flagyl for bacterial vaginosis and keflex for a UTI.  Patient reports that the discharge did initially improve, however she has experienced worsening discharge over the last 1-2 weeks.  She describes the discharge as white and malodorous.  Associated symptoms include vaginal redness/irritation, vaginal itching, and burning with urination.  No  urinary frequency, urgency, hematuria, vaginal bleeding, suprapubic/flank pain, genital lesions, or F/C.  The patient has not been sexually active since her last visit and is not concerned for STIs.          Review of Systems   Constitutional:  Negative for chills and fever.   HENT:  Negative for congestion, ear pain, rhinorrhea and sore throat.    Eyes:  Negative for pain  and visual disturbance.   Respiratory:  Negative for cough and shortness of breath.    Cardiovascular:  Negative for chest pain and palpitations.   Gastrointestinal:  Negative for abdominal pain, diarrhea, nausea and vomiting.   Genitourinary:  Positive for dysuria and vaginal discharge. Negative for difficulty urinating, flank pain, genital sores, hematuria and vaginal bleeding.        + Vaginal itching   Musculoskeletal:  Negative for arthralgias, back pain and myalgias.   Skin:  Negative for color change and rash.   Neurological:  Negative for seizures and syncope.   All other systems reviewed and are negative.      Objective       ED Triage Vitals [02/10/25 2029]   Temperature Pulse Blood Pressure Respirations SpO2 Patient Position - Orthostatic VS   98.3 °F (36.8 °C) 97 106/73 18 99 % Sitting      Temp Source Heart Rate Source BP Location FiO2 (%) Pain Score    Oral Monitor Left arm -- --      Vitals      Date and Time Temp Pulse SpO2 Resp BP Pain Score FACES Pain Rating User   02/10/25 2029 98.3 °F (36.8 °C) 97 99 % 18 106/73 -- --             Physical Exam  Vitals and nursing note reviewed. Exam conducted with a chaperone present (Maame).   Constitutional:       General: She is awake. She is not in acute distress.     Appearance: Normal appearance. She is well-developed, well-groomed and normal weight. She is not toxic-appearing or diaphoretic.   HENT:      Head: Normocephalic and atraumatic.      Right Ear: External ear normal.      Left Ear: External ear normal.      Nose: Nose normal.      Mouth/Throat:      Lips: Pink.      Mouth: Mucous membranes are moist.      Pharynx: Oropharynx is clear. Uvula midline.   Eyes:      General: Lids are normal. Vision grossly intact. Gaze aligned appropriately.      Conjunctiva/sclera: Conjunctivae normal.      Pupils: Pupils are equal, round, and reactive to light.   Cardiovascular:      Rate and Rhythm: Normal rate and regular rhythm.   Pulmonary:      Effort:  Pulmonary effort is normal. No respiratory distress.   Abdominal:      General: Abdomen is flat.      Palpations: Abdomen is soft.      Tenderness: There is no abdominal tenderness. There is no guarding or rebound.   Genitourinary:     Labia:         Right: No rash, tenderness or lesion.         Left: No rash, tenderness or lesion.       Comments: Vulvar erythema noted without any genital sores or lesions.  No obvious discharge noted on external examination.  Musculoskeletal:      Cervical back: Normal, full passive range of motion without pain and neck supple.      Thoracic back: Normal.      Lumbar back: Normal.   Skin:     General: Skin is warm.      Capillary Refill: Capillary refill takes less than 2 seconds.      Coloration: Skin is not pale.      Findings: No rash.   Neurological:      Mental Status: She is alert and oriented to person, place, and time.   Psychiatric:         Attention and Perception: Attention normal.         Mood and Affect: Mood normal.         Speech: Speech normal.         Behavior: Behavior normal. Behavior is cooperative.         Results Reviewed       Procedure Component Value Units Date/Time    Molecular Vaginal Panel [[id number]] Collected: 02/10/25 2041    Lab Status: In process Specimen: Genital from Vaginal Updated: 02/10/25 2110    UA w Reflex to Microscopic w Reflex to Culture [[id number]]  (Abnormal) Collected: 02/10/25 2041    Lab Status: Final result Specimen: Urine, Clean Catch Updated: 02/10/25 2052     Color, UA Yellow     Clarity, UA Slightly Cloudy     Specific Gravity, UA 1.015     pH, UA 7.0     Leukocytes, UA Negative     Nitrite, UA Negative     Protein, UA Negative mg/dl      Glucose, UA Negative mg/dl      Ketones, UA Negative mg/dl      Bilirubin, UA Negative     Occult Blood, UA Negative     UROBILINOGEN UA Negative mg/dL     POCT pregnancy, urine [[id number]]  (Normal) Collected: 02/10/25 2048    Lab Status: Final result Updated: 02/10/25 2048     EXT Preg Test,  Ur Negative     Control Valid            No orders to display       Procedures      ED Medication and Procedure Management   Prior to Admission Medications   Prescriptions Last Dose Informant Patient Reported? Taking?   mirtazapine (REMERON) 7.5 MG tablet   Yes No   Patient not taking: Reported on 7/18/2024      Facility-Administered Medications Last Administration Doses Remaining   etonogestrel (NEXPLANON) subdermal implant 68 mg 7/29/2024 12:35 PM         Patient's Medications   Discharge Prescriptions    No medications on file     No discharge procedures on file.  ED SEPSIS DOCUMENTATION   Time reflects when diagnosis was documented in both MDM as applicable and the Disposition within this note       Time User Action Codes Description Comment    2/10/2025  9:02 PM Racahel Tafoya Add [N89.8] Vaginal discharge                  Rachael Tafoya PA-C  02/10/25 [id number]

## 2025-05-18 ENCOUNTER — HOSPITAL ENCOUNTER (EMERGENCY)
Facility: HOSPITAL | Age: 25
Discharge: HOME | End: 2025-05-19
Attending: EMERGENCY MEDICINE
Payer: MEDICARE

## 2025-05-18 DIAGNOSIS — M25.512 ACUTE PAIN OF LEFT SHOULDER: ICD-10-CM

## 2025-05-18 DIAGNOSIS — R07.9 CHEST PAIN, UNSPECIFIED: Primary | ICD-10-CM

## 2025-05-18 PROCEDURE — 99285 EMERGENCY DEPT VISIT HI MDM: CPT

## 2025-05-18 PROCEDURE — 81025 URINE PREGNANCY TEST: CPT

## 2025-05-18 RX ORDER — KETOROLAC TROMETHAMINE 30 MG/ML
15 INJECTION, SOLUTION INTRAMUSCULAR; INTRAVENOUS ONCE
Status: COMPLETED | OUTPATIENT
Start: 2025-05-19 | End: 2025-05-19

## 2025-05-18 NOTE — Clinical Note
Wali Saini accompanied Jimmy Ny to the emergency department on 5/18/2025.    Return date if applicable: 05/20/2025        If you have any questions or concerns, please don't hesitate to call.      Sean Tang PA-C

## 2025-05-19 ENCOUNTER — APPOINTMENT (EMERGENCY)
Dept: RADIOLOGY | Facility: HOSPITAL | Age: 25
End: 2025-05-19
Payer: MEDICARE

## 2025-05-19 VITALS
WEIGHT: 123.24 LBS | HEART RATE: 73 BPM | DIASTOLIC BLOOD PRESSURE: 67 MMHG | OXYGEN SATURATION: 99 % | SYSTOLIC BLOOD PRESSURE: 113 MMHG | RESPIRATION RATE: 18 BRPM | BODY MASS INDEX: 23.29 KG/M2 | TEMPERATURE: 98.7 F

## 2025-05-19 LAB
2HR DELTA HS TROPONIN: 0 NG/L
ANION GAP SERPL CALCULATED.3IONS-SCNC: 9 MMOL/L (ref 4–13)
ATRIAL RATE: 70 BPM
BASOPHILS # BLD AUTO: 0.04 THOUSANDS/ÂΜL (ref 0–0.1)
BASOPHILS NFR BLD AUTO: 1 % (ref 0–1)
BUN SERPL-MCNC: 20 MG/DL (ref 5–25)
CALCIUM SERPL-MCNC: 9.4 MG/DL (ref 8.4–10.2)
CARDIAC TROPONIN I PNL SERPL HS: 6 NG/L (ref ?–50)
CARDIAC TROPONIN I PNL SERPL HS: 6 NG/L (ref ?–50)
CHLORIDE SERPL-SCNC: 103 MMOL/L (ref 96–108)
CO2 SERPL-SCNC: 25 MMOL/L (ref 21–32)
CREAT SERPL-MCNC: 0.99 MG/DL (ref 0.6–1.3)
EOSINOPHIL # BLD AUTO: 0.06 THOUSAND/ÂΜL (ref 0–0.61)
EOSINOPHIL NFR BLD AUTO: 1 % (ref 0–6)
ERYTHROCYTE [DISTWIDTH] IN BLOOD BY AUTOMATED COUNT: 11.9 % (ref 11.6–15.1)
EXT PREGNANCY TEST URINE: NEGATIVE
EXT. CONTROL: NORMAL
GFR SERPL CREATININE-BSD FRML MDRD: 81 ML/MIN/1.73SQ M
GLUCOSE SERPL-MCNC: 56 MG/DL (ref 65–140)
HCT VFR BLD AUTO: 41.9 % (ref 34.8–46.1)
HGB BLD-MCNC: 13.2 G/DL (ref 11.5–15.4)
IMM GRANULOCYTES # BLD AUTO: 0.03 THOUSAND/UL (ref 0–0.2)
IMM GRANULOCYTES NFR BLD AUTO: 0 % (ref 0–2)
LYMPHOCYTES # BLD AUTO: 2.9 THOUSANDS/ÂΜL (ref 0.6–4.47)
LYMPHOCYTES NFR BLD AUTO: 35 % (ref 14–44)
MCH RBC QN AUTO: 30.1 PG (ref 26.8–34.3)
MCHC RBC AUTO-ENTMCNC: 31.5 G/DL (ref 31.4–37.4)
MCV RBC AUTO: 96 FL (ref 82–98)
MONOCYTES # BLD AUTO: 0.75 THOUSAND/ÂΜL (ref 0.17–1.22)
MONOCYTES NFR BLD AUTO: 9 % (ref 4–12)
NEUTROPHILS # BLD AUTO: 4.57 THOUSANDS/ÂΜL (ref 1.85–7.62)
NEUTS SEG NFR BLD AUTO: 54 % (ref 43–75)
NRBC BLD AUTO-RTO: 0 /100 WBCS
P AXIS: 78 DEGREES
PLATELET # BLD AUTO: 256 THOUSANDS/UL (ref 149–390)
PMV BLD AUTO: 9 FL (ref 8.9–12.7)
POTASSIUM SERPL-SCNC: 3.6 MMOL/L (ref 3.5–5.3)
PR INTERVAL: 146 MS
QRS AXIS: 86 DEGREES
QRSD INTERVAL: 72 MS
QT INTERVAL: 380 MS
QTC INTERVAL: 410 MS
RBC # BLD AUTO: 4.38 MILLION/UL (ref 3.81–5.12)
SODIUM SERPL-SCNC: 137 MMOL/L (ref 135–147)
T WAVE AXIS: 68 DEGREES
VENTRICULAR RATE: 70 BPM
WBC # BLD AUTO: 8.35 THOUSAND/UL (ref 4.31–10.16)

## 2025-05-19 PROCEDURE — 84484 ASSAY OF TROPONIN QUANT: CPT

## 2025-05-19 PROCEDURE — 73030 X-RAY EXAM OF SHOULDER: CPT

## 2025-05-19 PROCEDURE — 96374 THER/PROPH/DIAG INJ IV PUSH: CPT

## 2025-05-19 PROCEDURE — 99285 EMERGENCY DEPT VISIT HI MDM: CPT

## 2025-05-19 PROCEDURE — 80048 BASIC METABOLIC PNL TOTAL CA: CPT

## 2025-05-19 PROCEDURE — 93005 ELECTROCARDIOGRAM TRACING: CPT

## 2025-05-19 PROCEDURE — 71046 X-RAY EXAM CHEST 2 VIEWS: CPT

## 2025-05-19 PROCEDURE — 85025 COMPLETE CBC W/AUTO DIFF WBC: CPT

## 2025-05-19 PROCEDURE — 36415 COLL VENOUS BLD VENIPUNCTURE: CPT

## 2025-05-19 PROCEDURE — 93010 ELECTROCARDIOGRAM REPORT: CPT | Performed by: STUDENT IN AN ORGANIZED HEALTH CARE EDUCATION/TRAINING PROGRAM

## 2025-05-19 RX ORDER — HYDROXYZINE HYDROCHLORIDE 25 MG/1
25 TABLET, FILM COATED ORAL EVERY 6 HOURS PRN
Qty: 30 TABLET | Refills: 0 | Status: SHIPPED | OUTPATIENT
Start: 2025-05-19

## 2025-05-19 RX ADMIN — KETOROLAC TROMETHAMINE 15 MG: 30 INJECTION, SOLUTION INTRAMUSCULAR; INTRAVENOUS at 00:13

## 2025-05-19 NOTE — ED PROVIDER NOTES
Time reflects when diagnosis was documented in both MDM as applicable and the Disposition within this note       Time User Action Codes Description Comment    5/19/2025  3:07 AM Sean Tang Add [R07.9] Chest pain, unspecified     5/19/2025  3:07 AM Sean Tang Add [M25.512] Acute pain of left shoulder           ED Disposition       ED Disposition   Discharge    Condition   Stable    Date/Time   Mon May 19, 2025  3:12 AM    Comment   Jimmy Ny discharge to home/self care.                   Assessment & Plan       Medical Decision Making  24 year old female presenting with left shoulder pain that radiates down her left arm and towards the left side of her chest for 1 day. States the pain is worse with movement and with deep inhalation. She denies any known trauma to her shoulder. She has taken tylenol with some relief. She states she has not had symptoms like this before. She denies fevers, chills, URI symptoms, SOB, cough, abdominal pain, NVD, changes in appetite or bowel movements, urinary symptoms, visual changes, numbness, or weakness. She states she has been anxious and has had increased stress lately. Also states she vapes and occasionally smokes marijuana. On exam, patient resting comfortably. RRR, lungs CTA bl. Normal ROM of left shoulder. No erythema, edema, ecchymosis, or obvious deformity.     DDX including but not limited to: rotator cuff injury, impingement, unknown trauma, contusion, muscle strain, ACS, pneumonia    EKG NSR with sinus arrhythmia, no acute ischemic changes. Initial trop 6, delta trop 0. Mild improvement of symptoms with toradol. CBC, BMP, unremarkable. NAD on CXR or left shoulder XR. Recommended Tylenol/Motrin for pain. Referred patient to Orthopedics for persistent symptoms. Doubt major cardiac or pulmonary etiology at this time. Patient states she would like to try something for anxiety, prescribed Atarax. Advised to follow up with PCP. Prior to discharge, discharge  instructions were discussed with patient at bedside. Patient was provided both verbal and written instructions. Patient is understanding of the discharge instructions and is agreeable to plan of care. Return precautions were discussed with patient bedside, patient verbalized understanding of signs and symptoms that would necessitate return to the ED. All questions were answered. Patient was comfortable with the plan of care and discharged to home.         Problems Addressed:  Acute pain of left shoulder: acute illness or injury  Chest pain, unspecified: acute illness or injury    Amount and/or Complexity of Data Reviewed  Labs: ordered.  Radiology: ordered and independent interpretation performed.    Risk  Prescription drug management.             Medications   ketorolac (TORADOL) injection 15 mg (15 mg Intravenous Given 5/19/25 0013)       ED Risk Strat Scores                    No data recorded        SBIRT 22yo+      Flowsheet Row Most Recent Value   Initial Alcohol Screen: US AUDIT-C     1. How often do you have a drink containing alcohol? 0 Filed at: 05/18/2025 2257   2. How many drinks containing alcohol do you have on a typical day you are drinking?  0 Filed at: 05/18/2025 2257   3b. FEMALE Any Age, or MALE 65+: How often do you have 4 or more drinks on one occassion? 0 Filed at: 05/18/2025 2257   Audit-C Score 0 Filed at: 05/18/2025 2257   NOR[place]: How many times in the past year have you...    Used an illegal drug or used a prescription medication for non-medical reasons? Never Filed at: 05/18/2025 2257                            History of Present Illness       Chief Complaint   Patient presents with    Arm Pain     Pt c/o left shoulder/arm pain that started today, reports she has some pain on the left side of her chest/rib area when she moves her left arm. Hollie any injury to the arm. Pt took tylenol around 6pm.        Past Medical History:   Diagnosis Date    Bipolar disorder (HCC)       Past Surgical  History:   Procedure Laterality Date    WISDOM TOOTH EXTRACTION        Family History   Problem Relation Age of Onset    Cancer Maternal Grandmother     Breast cancer Neg Hx     Colon cancer Neg Hx     Ovarian cancer Neg Hx     Uterine cancer Neg Hx       Social History[1]   E-Cigarette/Vaping    E-Cigarette Use Current Some Day User       E-Cigarette/Vaping Substances    Nicotine Yes     THC No     CBD No     Flavoring Yes     Other No     Unknown No       I have reviewed and agree with the history as documented.     24 year old female presenting with left shoulder pain that radiates down her left arm and towards the left side of her chest for 1 day. States the pain is worse with movement and with deep inhalation. She denies any known trauma to her shoulder. She has taken tylenol with some relief. She states she has not had symptoms like this before. She denies fevers, chills, URI symptoms, SOB, cough, abdominal pain, NVD, changes in appetite or bowel movements, urinary symptoms, visual changes, numbness, or weakness. She states she has been anxious and has had increased stress lately. Also states she vapes and occasionally smokes marijuana.       Arm Pain  Associated symptoms: chest pain (left sided)    Associated symptoms: no abdominal pain, no cough, no diarrhea, no ear pain, no fever, no headaches, no nausea, no rash, no shortness of breath, no sore throat and no vomiting        Review of Systems   Constitutional:  Negative for chills and fever.   HENT:  Negative for ear pain and sore throat.    Eyes:  Negative for pain and visual disturbance.   Respiratory:  Negative for cough and shortness of breath.    Cardiovascular:  Positive for chest pain (left sided). Negative for palpitations.   Gastrointestinal:  Negative for abdominal pain, diarrhea, nausea and vomiting.   Genitourinary:  Negative for dysuria and hematuria.   Musculoskeletal:  Negative for arthralgias and back pain.   Skin:  Negative for color change  and rash.   Neurological:  Negative for dizziness, seizures, syncope, weakness, light-headedness, numbness and headaches.   Psychiatric/Behavioral: Negative.     All other systems reviewed and are negative.          Objective       ED Triage Vitals   Temperature Pulse Blood Pressure Respirations SpO2 Patient Position - Orthostatic VS   05/18/25 2243 05/18/25 2243 05/18/25 2243 05/18/25 2243 05/18/25 2243 05/18/25 2243   98.7 °F (37.1 °C) 84 113/75 18 100 % Sitting      Temp Source Heart Rate Source BP Location FiO2 (%) Pain Score    05/18/25 2243 05/18/25 2243 05/18/25 2243 -- 05/19/25 0013    Oral Monitor Right arm  10 - Worst Possible Pain      Vitals      Date and Time Temp Pulse SpO2 Resp BP Pain Score FACES Pain Rating User   05/19/25 0308 -- 73 99 % 18 113/67 -- -- SD   05/19/25 0127 -- -- -- -- -- 5 -- SD   05/19/25 0013 -- -- -- -- -- 10 - Worst Possible Pain -- KB   05/18/25 2243 98.7 °F (37.1 °C) 84 100 % 18 113/75 -- -- MN            Physical Exam  Vitals reviewed.   Constitutional:       Appearance: Normal appearance.   HENT:      Head: Normocephalic and atraumatic.      Nose: Nose normal.      Mouth/Throat:      Mouth: Mucous membranes are moist.      Pharynx: Oropharynx is clear.     Eyes:      Conjunctiva/sclera: Conjunctivae normal.       Cardiovascular:      Rate and Rhythm: Normal rate and regular rhythm.      Pulses: Normal pulses.      Heart sounds: Normal heart sounds.   Pulmonary:      Effort: Pulmonary effort is normal.      Breath sounds: Normal breath sounds.   Abdominal:      General: Abdomen is flat. Bowel sounds are normal. There is no distension.      Palpations: Abdomen is soft.      Tenderness: There is no abdominal tenderness. There is no guarding or rebound.     Musculoskeletal:         General: Normal range of motion.      Cervical back: Normal range of motion.      Comments: Normal ROM of left shoulder. No erythema, edema, ecchymosis, or obvious deformity.      Skin:     General:  Skin is warm and dry.      Capillary Refill: Capillary refill takes less than 2 seconds.     Neurological:      General: No focal deficit present.      Mental Status: She is alert and oriented to person, place, and time. Mental status is at baseline.     Psychiatric:         Mood and Affect: Mood normal.         Behavior: Behavior normal.         Thought Content: Thought content normal.         Judgment: Judgment normal.         Results Reviewed       Procedure Component Value Units Date/Time    HS Troponin I 2hr [[id number]]  (Normal) Collected: 05/19/25 0234    Lab Status: Final result Specimen: Blood from Arm, Right Updated: 05/19/25 0302     hs TnI 2hr 6 ng/L      Delta 2hr hsTnI 0 ng/L     HS Troponin 0hr (reflex protocol) [[id number]]  (Normal) Collected: 05/19/25 0003    Lab Status: Final result Specimen: Blood from Arm, Right Updated: 05/19/25 0048     hs TnI 0hr 6 ng/L     Basic metabolic panel [[id number]]  (Abnormal) Collected: 05/19/25 0003    Lab Status: Final result Specimen: Blood from Arm, Right Updated: 05/19/25 0036     Sodium 137 mmol/L      Potassium 3.6 mmol/L      Chloride 103 mmol/L      CO2 25 mmol/L      ANION GAP 9 mmol/L      BUN 20 mg/dL      Creatinine 0.99 mg/dL      Glucose 56 mg/dL      Calcium 9.4 mg/dL      eGFR 81 ml/min/1.73sq m     Narrative:      National Kidney Disease Foundation guidelines for Chronic Kidney Disease (CKD):     Stage 1 with normal or high GFR (GFR > 90 mL/min/1.73 square meters)    Stage 2 Mild CKD (GFR = 60-89 mL/min/1.73 square meters)    Stage 3A Moderate CKD (GFR = 45-59 mL/min/1.73 square meters)    Stage 3B Moderate CKD (GFR = 30-44 mL/min/1.73 square meters)    Stage 4 Severe CKD (GFR = 15-29 mL/min/1.73 square meters)    Stage 5 End Stage CKD (GFR <15 mL/min/1.73 square meters)  Note: GFR calculation is accurate only with a steady state creatinine    CBC and differential [[id number]] Collected: 05/19/25 0003    Lab Status: Final result Specimen: Blood from  Arm, Right Updated: 05/19/25 0021     WBC 8.35 Thousand/uL      RBC 4.38 Million/uL      Hemoglobin 13.2 g/dL      Hematocrit 41.9 %      MCV 96 fL      MCH 30.1 pg      MCHC 31.5 g/dL      RDW 11.9 %      MPV 9.0 fL      Platelets 256 Thousands/uL      nRBC 0 /100 WBCs      Segmented % 54 %      Immature Grans % 0 %      Lymphocytes % 35 %      Monocytes % 9 %      Eosinophils Relative 1 %      Basophils Relative 1 %      Absolute Neutrophils 4.57 Thousands/µL      Absolute Immature Grans 0.03 Thousand/uL      Absolute Lymphocytes 2.90 Thousands/µL      Absolute Monocytes 0.75 Thousand/µL      Eosinophils Absolute 0.06 Thousand/µL      Basophils Absolute 0.04 Thousands/µL     POCT pregnancy, urine [[id number]]  (Normal) Collected: 05/19/25 0013    Lab Status: Final result Updated: 05/19/25 0013     EXT Preg Test, Ur Negative     Control Valid            XR shoulder 2+ views LEFT   ED Interpretation by Sean Tang PA-C (05/19 0131)   No acute abnormalities per my read in ED.       Final Interpretation by Lakeisha Montana MD (05/19 0142)      No acute osseous abnormality.         Computerized Assisted Algorithm (CAA) may have been used to analyze all applicable images.         Workstation performed: [id number]         XR chest 2 views   ED Interpretation by Sean Tang PA-C (05/19 0131)   No acute cardiopulmonary abnormalities per my read in ED.       Final Interpretation by Lakeisha Montana MD (05/19 0142)      No acute cardiopulmonary disease.            Workstation performed: [id number]             ECG 12 Lead Documentation Only    Date/Time: 5/19/2025 12:10 AM    Performed by: Sean Tang PA-C  Authorized by: Sean Tang PA-C    Indications / Diagnosis:  CP  Patient location:  ED  Interpretation:     Interpretation: normal    Rate:     ECG rate:  70    ECG rate assessment: normal    Rhythm:     Rhythm: sinus rhythm    Ectopy:     Ectopy: none    QRS:     QRS axis:  Normal    QRS intervals:   Normal  Conduction:     Conduction: normal    ST segments:     ST segments:  Normal  T waves:     T waves: normal    Comments:      NSR with sinus arrhythmia, no acute ischemic changes.       ED Medication and Procedure Management   Prior to Admission Medications   Prescriptions Last Dose Informant Patient Reported? Taking?   mirtazapine (REMERON) 7.5 MG tablet   Yes No   Patient not taking: Reported on 7/18/2024      Facility-Administered Medications Last Administration Doses Remaining   etonogestrel (NEXPLANON) subdermal implant 68 mg 7/29/2024 12:35 PM         Discharge Medication List as of 5/19/2025  3:12 AM        START taking these medications    Details   hydrOXYzine HCL (ATARAX) 25 mg tablet Take 1 tablet (25 mg total) by mouth every 6 (six) hours as needed for itching, Starting Mon 5/19/2025, Normal           CONTINUE these medications which have NOT CHANGED    Details   mirtazapine (REMERON) 7.5 MG tablet Starting Tue 4/11/2023, Historical Med             ED SEPSIS DOCUMENTATION   Time reflects when diagnosis was documented in both MDM as applicable and the Disposition within this note       Time User Action Codes Description Comment    5/19/2025  3:07 AM Sean Tang [R07.9] Chest pain, unspecified     5/19/2025  3:07 AM Sean Tang [M25.512] Acute pain of left shoulder                    [1]   Social History  Tobacco Use    Smoking status: Former     Types: E-Cigarettes    Smokeless tobacco: Never    Tobacco comments:     vape   Vaping Use    Vaping status: Some Days    Substances: Nicotine, Flavoring   Substance Use Topics    Alcohol use: Yes     Comment: occasional    Drug use: Yes     Types: Marijuana        Sean Tang PA-C  05/19/25 0712

## 2025-05-19 NOTE — DISCHARGE INSTRUCTIONS
Atarax as prescribed for anxiety/stress.  Follow up with PCP within a few days.  Follow up with orthopedics for unresolving symptoms.  Return to ER with worsening chest pain, shortness of breath, fevers, or any other concerning symptoms.

## 2025-06-18 ENCOUNTER — APPOINTMENT (OUTPATIENT)
Dept: LAB | Facility: MEDICAL CENTER | Age: 25
End: 2025-06-18
Attending: OBSTETRICS & GYNECOLOGY
Payer: MEDICARE

## 2025-06-18 ENCOUNTER — ANNUAL EXAM (OUTPATIENT)
Dept: OBGYN CLINIC | Facility: MEDICAL CENTER | Age: 25
End: 2025-06-18
Payer: MEDICARE

## 2025-06-18 VITALS
BODY MASS INDEX: 22.41 KG/M2 | DIASTOLIC BLOOD PRESSURE: 70 MMHG | WEIGHT: 118.7 LBS | SYSTOLIC BLOOD PRESSURE: 115 MMHG | HEIGHT: 61 IN

## 2025-06-18 DIAGNOSIS — Z01.419 ENCNTR FOR GYN EXAM (GENERAL) (ROUTINE) W/O ABN FINDINGS: Primary | ICD-10-CM

## 2025-06-18 DIAGNOSIS — Z11.3 SCREENING FOR STD (SEXUALLY TRANSMITTED DISEASE): ICD-10-CM

## 2025-06-18 PROCEDURE — 87491 CHLMYD TRACH DNA AMP PROBE: CPT | Performed by: OBSTETRICS & GYNECOLOGY

## 2025-06-18 PROCEDURE — 87591 N.GONORRHOEAE DNA AMP PROB: CPT | Performed by: OBSTETRICS & GYNECOLOGY

## 2025-06-18 PROCEDURE — 86780 TREPONEMA PALLIDUM: CPT

## 2025-06-18 PROCEDURE — 99395 PREV VISIT EST AGE 18-39: CPT | Performed by: OBSTETRICS & GYNECOLOGY

## 2025-06-18 PROCEDURE — 87340 HEPATITIS B SURFACE AG IA: CPT

## 2025-06-18 PROCEDURE — 86695 HERPES SIMPLEX TYPE 1 TEST: CPT

## 2025-06-18 PROCEDURE — 36415 COLL VENOUS BLD VENIPUNCTURE: CPT

## 2025-06-18 PROCEDURE — 86696 HERPES SIMPLEX TYPE 2 TEST: CPT

## 2025-06-18 PROCEDURE — 86803 HEPATITIS C AB TEST: CPT

## 2025-06-18 PROCEDURE — 87389 HIV-1 AG W/HIV-1&-2 AB AG IA: CPT

## 2025-06-18 NOTE — PATIENT INSTRUCTIONS
Patient Education     Human Papillomavirus Vaccine (9-Valent) (MILLS man pap ih LO ma VYE ekta vak SEEN nine VAY lent)   Nombres comerciales: MILEY. UU. Gardasil 9   Nombres comerciales: [facility] Gardasil 9   ¿Para qué se utiliza kennedy medicamento?   Kennedy medicamento se utiliza para evitar el cáncer anal, verrugas genitales y pólipos anales que puedan resultar en cáncer.  Se utiliza para evitar los siguientes problemas de nick causados por el VPH: cáncer cervical, cáncer vaginal o vulvar, cáncer en la boca, garganta, yarelis o domenico y pólipos cervicales, vaginales o vulvares que puedan originar cáncer.  ¿Qué necesito decirle a mi médico ANTES de kelly kennedy medicamento?   Si es alérgico a kennedy medicamento, a algún componente de kennedy medicamento, o a otros medicamentos, alimentos o sustancias. Informe a davis médico acerca de esta alergia y qué síntomas ha presentado.  Kennedy medicamento puede interactuar con otros medicamentos o trastornos.  Informe a davis médico y farmacéutico acerca de todos los medicamentos que tome (moriah estos recetados o de venta vidal, productos naturales, vitaminas) y los trastornos que tenga. Debe verificar que sea seguro para usted kelly kennedy medicamento junto con todos monet otros medicamentos y trastornos. No empiece, detenga ni modifique la dosis de ningún medicamento sin consultar antes al médico.  ¿Qué tha saber o hacer mientras dakota kennedy medicamento?   Avise a todos monet proveedores de atención médica que shayna kennedy medicamento. Elim incluye a los médicos, enfermeras, farmacéuticos y dentistas.  Es posible que kennedy medicamento no proteja a todas las personas que lo usen. Consulte al médico.  Asegúrese de tener exámenes ginecológicos regulares. El médico le indicará con qué frecuencia debe realizarlos.  Asegúrese de hacerse exámenes de detección de cáncer con regularidad según las indicaciones del médico. El médico le indicará con qué frecuencia debe realizarlos. Si tiene alguna pregunta, consulte con el  médico.  Informe a davis médico si está embarazada, tiene intención de embarazarse o está amamantando. Tendrá que hablar acerca de los riesgos y beneficios para usted y el bebé.  ¿Cuáles son los efectos secundarios por los que tha llamar a mi médico de inmediato?   ADVERTENCIA/PRECAUCIÓN: A pesar de que es muy poco frecuente, algunas personas pueden sufrir efectos secundarios muy graves, que causen incluso la muerte, al kelly un medicamento. Si presenta alguno de los siguientes signos o síntomas que puedan estar relacionados con un efecto secundario muy grave, infórmelo a davis médico o busque asistencia médica de inmediato:  Signos de reacción alérgica tales eugene sarpullido; urticaria; picazón; piel enrojecida, hinchada, con ampollas o descamada, con o sin fiebre; sibilancia; opresión en el pecho o la garganta; problemas para respirar, tragar o hablar; ronquera inusual; o hinchazón de la boca, el iza, los labios, la lengua o la garganta.  Mareos severos o desmayos.  Dificultad para controlar los movimientos del cuerpo.  Convulsiones.  Dolor en las articulaciones.  Glándula inflamada.  Confusión.  Escalofríos.  Dolor en las piernas.  Falta de aire.  Dolor de pecho.  Dolor muscular o debilidad.  Hematoma o hemorragia sin motivo aparente.  Síntomas de infección en la piel eugene supuración, piel caliente, hinchazón, enrojecimiento o dolor.  ¿Qué otros efectos secundarios tiene kennedy medicamento?   Todos los medicamentos pueden tener efectos secundarios. Sin embargo, muchas personas no tienen ningún efecto secundario o tienen solamente efectos secundarios menores. Llame a davis médico o busque asistencia médica si le molesta alguno de estos efectos secundarios o no desaparece:  Se siente mareado, cansado o débil.  Dolor de domenico.  Fiebre leve.  Náuseas.  Dolor estomacal o diarrea.  Dolor de garganta.  Dolor, enrojecimiento o hinchazón donde se aplicó la inyección.  Irritación en la david en la que se aplicó la  inyección.  Pequeño bulto en la david en la que se aplicó la inyección.  Estos no son todos los efectos secundarios que podrían ocurrir. Si tiene preguntas acerca de los efectos secundarios, llame al médico. Llame al médico para que lo aconseje acerca de los efectos secundarios.  Puede informar los efectos secundarios al organismo de nick de davis país.  Informe los efectos secundarios al Sistema de notificación de eventos adversos de vacunas (VAERS) de la FDA/CDC en https://vaers.hhs.gov/reportevent.html o llame al 1-917.759.1445.  ¿Cómo se shayna mejor brtitany medicamento?   [place] brittany medicamento según las indicaciones de davis médico. [place] toda la información que se le brinde. Siga todas las instrucciones con atención.  Brittany medicamento se administra en forma de inyección intramuscular.  ¿Qué tha hacer si no dakota trae dosis?   Llame al médico para obtener instrucciones.  ¿Cómo almaceno o descarto brittany medicamento?   Brittany medicamento se le administrará en un hospital o consultorio médico. Si se almacena en davis casa, siga las indicaciones de davis médico o farmacéutico sobre eugene hacerlo.  Datos generales sobre el medicamento   Si monet síntomas o trastornos no mejoran o si empeoran, llame al médico.  No comparta davis medicamento con otras personas ni tome el medicamento de ninguna otra persona.  Guarde los medicamentos en un lugar seguro. Mantenga todo medicamento fuera del alcance de los niños y las mascotas.  Deseche los medicamentos sin usar o que hayan expirado. No los tire por el retrete ni los vierta al desagüe a menos que así se lo indiquen. Consulte con el farmacéutico si tiene preguntas sobre la mejor manera de desechar medicamentos. Pueden existir programas de devolución de medicamentos en davis área.  Algunos medicamentos pueden tener otro folleto informativo para el paciente. Si tiene alguna pregunta sobre brittany medicamento, hable con davis médico, enfermera, farmacéutico u otro proveedor de atención médica.  Algunos  medicamentos pueden tener otro folleto informativo para el paciente. Consulte al farmacéutico. Si tiene alguna pregunta sobre kennedy medicamento, hable con davis médico, enfermera, farmacéutico u otro proveedor de atención médica.  Si naais que ha habido trae sobredosis, llame al centro de toxicología local o busque atención médica de inmediato. Prepárese para responder qué se ingirió, qué cantidad y cuándo.  Información adicional   Los Centros de Control y Prevención de Enfermedades (CDC, por davis siglas en inglés) producen las hojas informativas sobre vacunas (VIS, por davis siglas en inglés). Cada VIS ofrece información pertinente para educar adecuadamente al adulto que recibe la vacuna, o en el chato de menores de edad, a monet padres o representantes legales acerca de los riesgos y ventajas de cada vacuna. Antes de que el médico vacune a un adulto o jennifer, el proveedor debe entregar trae copia de la VIS correspondiente según lo requiere la [place] Nacional de Lesiones Causadas por Vacunas Infantiles. También hay versiones en distintos idiomas.  http://www.cdc.gov/vaccines/hcp/vis/vis-statements/hpv-gardasil-9.html   Uso de la información por el consumidor y exención de responsabilidad   Esta información general es un resumen limitado de la información sobre el diagnóstico, el tratamiento y/o la medicación. No pretende ser exhaustivo y debe utilizarse eugene trae herramienta para ayudar al usuario a comprender y/o evaluar las posibles opciones de diagnóstico y tratamiento. NO incluye toda la información sobre las enfermedades, los tratamientos, los medicamentos, los efectos secundarios o los riesgos que pueden aplicarse a un paciente específico. No pretende ser un consejo médico ni un sustituto del consejo médico, el diagnóstico o el tratamiento de un proveedor de atención médica basado en el examen y la evaluación del proveedor de atención médica de las circunstancias específicas y únicas de un paciente. Los pacientes deben hablar con  un proveedor de atención médica para obtener información completa sobre davis nick, preguntas médicas y opciones de tratamiento, incluidos los riesgos o beneficios relacionados con el uso de medicamentos. Esta información no respalda ningún tratamiento o medicamento eugene seguro, eficaz o aprobado para tratar a un paciente específico. [employer] y monet afiliados renuncian a cualquier garantía o responsabilidad relacionada con esta información o con el uso que se maddison de esta. El uso de esta información se rige por las Condiciones de uso, disponibles en https://www.Contactuallyer.com/en/know/clinical-effectiveness-terms.  Última fecha de revisión   2023-05-04  Derechos de autor   © 2024 [employer] y monet licenciantes y/o afiliados. Todos los derechos reservados.  Patient Education     HPV (Human Papillomavirus) Vaccine CDC Vaccine Information Statement (VIS)   About this topic

## 2025-06-18 NOTE — PROGRESS NOTES
ASSESSMENT & PLAN: Diagnoses and all orders for this visit:    Encntr for gyn exam (general) (routine) w/o abn findings    Screening for STD (sexually transmitted disease)  -     Chlamydia/GC amplified DNA by PCR  -     HIV 1/2 AB/AG w Reflex [facility] for 2 yr old and above; Future  -     RPR-Syphilis Screening (Total Syphilis IGG/IGM); Future  -     Hepatitis B surface antigen; Future  -     Hepatitis C antibody; Future  -     Herpes I/II IgG Antibodies; Future         1.  Routine well woman exam done today  2.  Pap:  The patient's pap is up to date.  Pap was not done today.  Current ASCCP Guidelines reviewed.  3.  STD testing was done.  Cultures taken today to test for gonorrhea and chlamydia.  Patient will present to the lab for HIV, hepatitis B&C, HSV and RPR testing.  4.  Patient does not think that she has had her Gardasil vaccination.  Recommendations reviewed.  5. The following were reviewed in today's visit: adequate intake of calcium and vitamin D, exercise, and healthy diet.  6. F/u in 1 year for next routine GYN exam.    CC:  Annual Gynecologic Examination    HPI: Jimmy Ny is a 24 y.o. G0 who presents for annual gynecologic examination.  She has the following concerns:  VD, pinkish.  Unsure if it is her menses since she has the Nexplanon.  Patient denies any odor irritation or itching.    Health Maintenance:    Patient describes her health as good.  The last health maintenance visit was 1 years ago.  Patient does not have weight concerns.  She exercises with walking at work.    She does not wear her seatbelt all of the time.    She does perform regular monthly self breast exams.    She does feels safe at home.   Patients does not follow a special diet.  Patients gets 1 servings of dairy or calcium rich foods a day.    Last pap: 1 year ago nl    Problem List[1]    Past Medical History[2]    Past Surgical History[3]    Past OB/Gyn History:  Pt does not have menstrual issues.   History of sexually  transmitted infection: No.  History of abnormal pap smears: No.    Patient is currently sexually active.  heterosexual. The current method of family planning is Nexplanon, placed last year.    Family History[4]    Social History:  Social History     Socioeconomic History    Marital status: Single     Spouse name: Not on file    Number of children: Not on file    Years of education: Not on file    Highest education level: Not on file   Occupational History    Not on file   Tobacco Use    Smoking status: Former     Types: E-Cigarettes    Smokeless tobacco: Never    Tobacco comments:     vape   Vaping Use    Vaping status: Some Days    Substances: Nicotine, Flavoring   Substance and Sexual Activity    Alcohol use: Yes     Comment: occasional    Drug use: Yes     Types: Marijuana    Sexual activity: Yes     Partners: Male     Comment: current partner of 8 months   Other Topics Concern    Not on file   Social History Narrative    Not on file     Social Drivers of Health     Financial Resource Strain: Low Risk  (6/22/2023)    Overall Financial Resource Strain (CARDIA)     Difficulty of Paying Living Expenses: Not hard at all   Food Insecurity: No Food Insecurity (6/22/2023)    Hunger Vital Sign     Worried About Running Out of Food in the Last Year: Never true     Ran Out of Food in the Last Year: Never true   Transportation Needs: No Transportation Needs (6/22/2023)    PRAPARE - Transportation     Lack of Transportation (Medical): No     Lack of Transportation (Non-Medical): No   Physical Activity: Not on file   Stress: Not on file   Social Connections: Not on file   Intimate Partner Violence: Not on file   Housing Stability: Not on file     Presently lives with mother, aunt, and 2 cousins.  Patient is currently employed works in [employer].      Allergies[5]    Current Medications[6]      Review of Systems  Constitutional :no fever, feels well, no tiredness, no recent weight gain or loss  ENT: no ear ache, no loss of  "hearing, no nosebleeds or nasal discharge, no sore throat or hoarseness.  Cardiovascular: no complaints of slow or fast heart beat, no chest pain, no palpitations, no leg claudication or lower extremity edema.  Respiratory: no complaints of shortness of shortness of breath, no ROJO  Breasts:no complaints of breast pain, breast lump, or nipple discharge  Gastrointestinal: no complaints of abdominal pain, constipation, nausea, vomiting, or diarrhea or bloody stools  Genitourinary : no complaints of dysuria, incontinence, pelvic pain, no dysmenorrhea, vaginal discharge or abnormal vaginal bleeding and as noted in HPI.  Musculoskeletal: no complaints of arthralgia, no myalgia, no joint swelling or stiffness, no limb pain or swelling.  Integumentary: no complaints of skin rash or lesion, itching or dry skin  Neurological: no complaints of headache, no confusion, no numbness or tingling, no dizziness or fainting      Physical Exam:   /70   Ht 5' 1\" (1.549 m)   Wt 53.8 kg (118 lb 11.2 oz)   LMP 06/01/2025 (Approximate)   BMI 22.43 kg/m²     General: appears stated age, cooperative, alert normal mood and affect   Psychiatric oriented to person, place and time.  Mood and affect normal   Neck: normal, supple,trachea midline, no masses.  Thyroid: normal, no thyromegaly   Heart: regular rate and rhythm, S1, S2 normal, no murmur, click, rub or gallop   Lungs: clear to auscultation bilaterally, no increased work of breathing or signs of respiratory distress   Breasts: normal, no dimpling or skin changes noted   Abdomen: soft, non-tender, without masses or organomegaly   Vulva: normal , no lesions   Vagina: normal , no lesions or dryness, small amount of dark blood noted   Urethra: normal   Urethal meatus normal   Bladder Normal, soft, non-tender and no prolapse or masses appreciated   Cervix: normal, no palpable masses    Uterus: normal , non-tender, not enlarged, no palpable masses   Adnexa: normal, non-tender without " fullness or masses   Lymphatic Palpation of lymph nodes in neck, axilla, groin and/or other locations: no lymphadenopathy or masses noted   Skin Normal skin turgor and no rashes.  Palpation of skin and subcutaneous tissue normal.             [1] There is no problem list on file for this patient.   [2]   Past Medical History:  Diagnosis Date    Bipolar disorder (HCC)    [3]   Past Surgical History:  Procedure Laterality Date    WISDOM TOOTH EXTRACTION     [4]   Family History  Problem Relation Name Age of Onset    Cancer Maternal Grandmother      Breast cancer Neg Hx      Colon cancer Neg Hx      Ovarian cancer Neg Hx      Uterine cancer Neg Hx     [5] No Known Allergies  [6] No current outpatient medications on file.    Current Facility-Administered Medications:     etonogestrel (NEXPLANON) subdermal implant 68 mg, 68 mg, Subdermal, Once every 3 years, , 68 mg at 07/29/24 [id number]

## 2025-06-19 LAB
C TRACH DNA SPEC QL NAA+PROBE: NEGATIVE
HBV SURFACE AG SER QL: NORMAL
HCV AB SER QL: NORMAL
HIV 1+2 AB+HIV1 P24 AG SERPL QL IA: NORMAL
HSV2 IGG SERPL QL IA: NEGATIVE
HSV2 IGG SERPL QL IA: NEGATIVE
N GONORRHOEA DNA SPEC QL NAA+PROBE: NEGATIVE
TREPONEMA PALLIDUM IGG+IGM AB [PRESENCE] IN SERUM OR PLASMA BY IMMUNOASSAY: NORMAL

## 2025-06-20 ENCOUNTER — RESULTS FOLLOW-UP (OUTPATIENT)
Dept: OBGYN CLINIC | Facility: MEDICAL CENTER | Age: 25
End: 2025-06-20